# Patient Record
Sex: MALE | Race: BLACK OR AFRICAN AMERICAN | NOT HISPANIC OR LATINO | Employment: STUDENT | URBAN - METROPOLITAN AREA
[De-identification: names, ages, dates, MRNs, and addresses within clinical notes are randomized per-mention and may not be internally consistent; named-entity substitution may affect disease eponyms.]

---
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
## 2023-01-03 ENCOUNTER — ATHLETIC TRAINING SESSION (OUTPATIENT)
Dept: SPORTS MEDICINE | Facility: CLINIC | Age: 18
End: 2023-01-03
Payer: COMMERCIAL

## 2023-01-03 NOTE — PROGRESS NOTES
[facility]       Athletic Training Injury Evaluation Note     Name: Dieudonne Martin  Clinic Number: [id number]  Sport: Men's Basketball    Reason For Visit: Evaluation  Initial Injury Date: 1/3/2023  Affected Area: Left Shoulder    Visit Date: 1/3/2023      Subjective     HPI: Past history of Left shoulder instability    Injury happened during basketball practice. Patient was playing defense in the post. Arm/shoulder was out at 90 flexion and 90 IR on teammates back and teammate pushed/pulled arm down in which the patient felt immediate pain and weakness from shoulder.   Student-athlete reports: Shoulder pain and shoulder weakness      Objective     Observations:  Tenderness on anterior deltoid and on biceps tendon area.    ROM:  + RROM shoulder ER        Assessment     Initial Impression: Shoulder subluxation    Differential Diagnosis:  Left shoulder labral tear  Left shoulder subluxation      Plan     Contact team physician  Contact Devendra PT  Anti Inflammatories  Shoulder Rehab program, emphasizing PNFs and shoulder stabilization exercises.         Pop Davis MAT, LAT, ATC    [employer] [facility]

## 2023-01-08 ENCOUNTER — ATHLETIC TRAINING SESSION (OUTPATIENT)
Dept: SPORTS MEDICINE | Facility: CLINIC | Age: 18
End: 2023-01-08
Payer: COMMERCIAL

## 2023-01-09 ENCOUNTER — CLINICAL SUPPORT (OUTPATIENT)
Dept: REHABILITATION | Facility: HOSPITAL | Age: 18
End: 2023-01-09
Payer: COMMERCIAL

## 2023-01-09 DIAGNOSIS — M25.612 DECREASED RANGE OF MOTION OF LEFT SHOULDER: ICD-10-CM

## 2023-01-09 DIAGNOSIS — G25.89 SCAPULAR DYSKINESIS: ICD-10-CM

## 2023-01-09 DIAGNOSIS — M25.512 ACUTE PAIN OF LEFT SHOULDER: ICD-10-CM

## 2023-01-09 PROCEDURE — 97140 MANUAL THERAPY 1/> REGIONS: CPT

## 2023-01-09 PROCEDURE — 97110 THERAPEUTIC EXERCISES: CPT

## 2023-01-09 NOTE — PROGRESS NOTES
[facility]   Physical Therapy Treatment Note     Name: Dieudonne Martin  Clinic Number: [id number]    Therapy Diagnosis:   Encounter Diagnoses   Name Primary?    Decreased range of motion of left shoulder     Acute pain of left shoulder     Scapular dyskinesis      Physician: Augustus Sepulveda MD    Visit Date: 1/9/2023  Physician Orders: PT Eval and Treat   Medical Diagnosis from Referral: M25.312 (ICD-10-CM) - Shoulder instability, left  Evaluation Date: 11/3/2022  Authorization Period Expiration: 10/31/2023  Plan of Care Expiration: 3/3/2023  Visit # / Visits authorized: 1/ 20 (+eval)     Time In: 0100 pm  Time Out: 200 pm  Total Billable Time : 60 minutes     Precautions: Standard    SUBJECTIVE     Pt reports: he is continuing to have feelings of shoulder instability with even minor contact but has been diligent with his HEP recently.  He was compliant with home exercise program.  Response to previous treatment: decreased pain   Functional change: improved FE    Pain: 1/10  Location: left shoulder      OBJECTIVE     Objective Measures updated at progress report unless specified.        Passive Range of Motion:   Shoulder Right Left   Flexion 180 180   Abduction 180 180   ER at 0 50 45**   ER at 90 100 110   IR 60 60      Active Range of Motion:   Shoulder Right Left   Flexion 180 180   Abduction 180 180   ER at 0 45 45   ER at 90 95 90   IR 55 55   Reach behind head T2 T2   Reach behind back  L1 L1      Strength:  Shoulder Right Left   Flexion 5 4+   Abduction 5 5   ER 5 4   IR 4+ 4-   Serratus Anterior 3- 3-   Middle Trap 3+ 3   Low Trap 3- 3-      Joint Mobility: Slight apprehension end range L ER. Hypermobile scapular upward rotation, increased posterior glide on L      Palpation: Pain over biceps tendon on L        Treatment       Dieudonne received the treatments listed below:      THERAPEUTIC EXERCISES to develop strength, endurance, ROM, flexibility, posture, and core  stabilization for 50 minutes including:  - Manual resistance iso; ER/IR 3 min  - SL External Rotation; 1#; 4 x 10  - SA Wall slides; 2 x 10  - SA Bear hug; 4 x 10  - High row w/ ER; YTB; 4 x 10  - Wall robots; 2 x 10  - LT; Lv 1.0; 4 x 5  - MT Lv 2.0 4 x 5    MANUAL THERAPY TECHNIQUES including Joint mobilizations and Soft tissue Mobilization were applied to L shoulder for 10 minutes.  - Gr 1-2 glenohumeral oscilations for decreased guarding and pain control   - manual RTC facilitation MET; 4 min    NEUROMUSCULAR RE-EDUCATION ACTIVITIES to improve Balance, Coordination, Kinesthetic, Sense, Proprioception, and Posture for 00 minutes.  The following were included:     THERAPEUTIC ACTIVITIES to improve dynamic and functional performance for 00 minutes including.        Patient Education and Home Exercises     Home Exercises Provided and Patient Education Provided     Education provided:   - contiune HEP with emphasis on scap positioning with walkouts    Written Home Exercises Provided: Patient instructed to cont prior HEP. Exercises were reviewed and Dieudonne was able to demonstrate them prior to the end of the session.  Dieudonne demonstrated good  understanding of the education provided. See EMR under Patient Instructions for exercises provided during therapy sessions    ASSESSMENT   Dieudonne presents with similar and somewhat progressed symptoms compared to eugene. He reports 3-4 additional instability events since eval and no HEP compliance prior to this week. He has been playing basketball at [employer] in [employer] but continues to have symptoms with any contact. He demonstrated continues decreased RTC activaiton with anterior humeral glide apparent in resting posture, poor serratus and low trap activation with excess scapular wining, and decreased active range of motion. He will benefit consistent and progressive RTC and scapular retraining to improve stability in his glenohumeral joint and improve his ability to play  basketball at a D1 level.     Dieudonne Is progressing well towards his goals.   Pt prognosis is Good.     Pt will continue to benefit from skilled outpatient physical therapy to address the deficits listed in the problem list box on initial evaluation, provide pt/family education and to maximize pt's level of independence in the home and community environment.     Pt's spiritual, cultural and educational needs considered and pt agreeable to plan of care and goals.     Anticipated barriers to physical therapy: travel    Goals: Short Term Goals:  2 weeks  1.Report decreased shoulder pain < / =  0/10  to increase tolerance for return to basketball pain free  2. Increase PROM full motion pain free   3. Increased strength by 1/3 MMT grade in subscap to increase tolerance for ADL and work activities.  4. Pt to tolerate HEP to improve ROM and independence with ADL's     Long Term Goals: 6 weeks  1.Report decreased shoulder pain  < / =  0 /10  to increase tolerance for return to weightlifting  2.Increase AROM to full motion pain free  3.Increase strength to >/= 4/5 in serratus and mid/low trap to increase tolerance for ADL and work activities.  4. Pt goal: return to basketball pain free  5. Pt will have improved gcode of CJ (20-40% limited) on FOTO shoulder in order to demonstrate true functional improvement.     PLAN     Progress scapular strengthening and RTC activation    Peggy Velásquez, PT

## 2023-01-09 NOTE — PROGRESS NOTES
Dieudonne completed:    [x]  INJURY TREATMENT   []  MAINTENANCE  DATE OF SERVICE: 1/8/2023  INJURY/CONDITON: Left shoulder instability    Dieudonne received the selected modalities after being cleared for contradictions.  Dieudonne received education on potenital side effects of the selected modalities and agreed to treatment.      MODALITIES:    Cryotherapy / Thermotherapy Duration  (Mins) Add. Tx Parameters / Comment   []Cold Tub / Whirlpool (50-60 F)     []Contrast Bath (105-110 F & 50-65 F)     []Game Ready     []Hot Pack     []Hot Tub / Whirlpool ( F)     []Ice Massage     []Ice Pack     []Paraffin Wax (126-130 F)     []Vapocoolant Spray        Comment:       Electrotherapy Waveform   (AC/DC) Modulation (Cont./Interrupted/Surged) Intensity   (V) Pulse Width/Dur.  (uS) Pulse Rate/Freq.  (Hz, PPS or CPS) Duration  (Mins) Add. Tx Parameters / Comment   []Combo          []E-Stim - IFC          []E-Stim - Premod          []E-Stim - Northern Irish          []E-Stim - TENS          []E-Stim - Other          []Iontophoresis        Meds:     Comment:      Ultrasound Duty Cycle   (%) Freq.  (Mhz) Intensity   (w/cm2) Duration  (Mins) Add. Tx Parameters / Comment   []Combo        []Phonophoresis     Meds:   []Ultrasound         []Ultrasound and E-Stim          Comment:        Massage Duration  (Mins) Add. Tx Parameters / Comment   []Massage - IASTM     []Massage - Scar Tissue     []Massage - Self Administered     []Massage - Therapeutic     []Myofascial Release        Comment:      Other Modalities Duration  (Mins)  Add. Tx Parameters / Comment   []Active Release     []Cupping     []Dry Needling     []Intermittent Compression      []Laser     []Lightwave     []Traction      []Other:       Comment:      THERAPEUTIC EXERCISES:    Stretching Cardio Rehab Other   []Stretching - Active []Cardio - Bike []Rehab - Ankle/Foot []Agility [x]PNF   []Stretching - Dynamic []Cardio - Elliptical []Rehab - Knee []Balance [x]ROM - Active    []Stretching - Passive []Cardio - Jog/Run []Rehab - Hip []Blood Flow Restriction []ROM - Passive   []Stretching - PNF []Cardio - Treadmill []Rehab - Wrist/Hand []Foam Roller []RTP - Concussion Protocol   []Stretching - Static []Cardio - Upper Body Ergometer []Rehab - Elbow []Functional Exercises []RTP - Sport Specific    []Cardio - Walk [x]Rehab - Shoulder []Joint Mobilization [x]Strengthening Exercises     []Rehab - Neck/Spine []Manual Therapy []Other:     []Rehab - Back []Plyometric Exercises      []Rehab - Other       Comment:            Warm-Up Reps/Sets/Time Weight #                         Exercise Reps/Sets/Time Weight #   Prone IR 2x8    Prone Rows with ER 3x12 3lbs   Serratus Punch 2x15    Prone middle Trap 2x10 (3sec holds)    Lying banded IR 3x15                               Comment:      Miscellaneous Add. Tx Parameters / Comment   []Compression Wrap    []Support Wrap    []Taping - Preventative    []Taping - Injured Part    []Wound Care    []Other:      Comment:

## 2023-01-15 PROBLEM — M25.512 ACUTE PAIN OF LEFT SHOULDER: Status: ACTIVE | Noted: 2023-01-15

## 2023-01-15 PROBLEM — G25.89 SCAPULAR DYSKINESIS: Status: ACTIVE | Noted: 2023-01-15

## 2023-01-15 PROBLEM — M25.612 DECREASED RANGE OF MOTION OF LEFT SHOULDER: Status: ACTIVE | Noted: 2023-01-15

## 2023-01-19 ENCOUNTER — CLINICAL SUPPORT (OUTPATIENT)
Dept: REHABILITATION | Facility: HOSPITAL | Age: 18
End: 2023-01-19
Payer: COMMERCIAL

## 2023-01-19 DIAGNOSIS — G25.89 SCAPULAR DYSKINESIS: ICD-10-CM

## 2023-01-19 DIAGNOSIS — M25.512 ACUTE PAIN OF LEFT SHOULDER: ICD-10-CM

## 2023-01-19 DIAGNOSIS — M25.612 DECREASED RANGE OF MOTION OF LEFT SHOULDER: Primary | ICD-10-CM

## 2023-01-19 PROCEDURE — 97110 THERAPEUTIC EXERCISES: CPT

## 2023-01-19 PROCEDURE — 97140 MANUAL THERAPY 1/> REGIONS: CPT

## 2023-01-22 NOTE — PROGRESS NOTES
[facility]   Physical Therapy Treatment Note     Name: Dieudonne Washington  Clinic Number: [id number]    Therapy Diagnosis:   Encounter Diagnoses   Name Primary?    Decreased range of motion of left shoulder Yes    Acute pain of left shoulder     Scapular dyskinesis      Physician: Augustus Sepulveda MD    Visit Date: 1/19/2023  Physician Orders: PT Eval and Treat   Medical Diagnosis from Referral: M25.312 (ICD-10-CM) - Shoulder instability, left  Evaluation Date: 11/3/2022  Authorization Period Expiration: 10/31/2023  Plan of Care Expiration: 3/3/2023  Visit # / Visits authorized: 2/ 20 (+eval)     Time In: 0300 pm  Time Out: 0400 pm  Total Billable Time : 60 minutes     Precautions: Standard    SUBJECTIVE     Pt reports: he has been consistent with his HEP daily and reports no new instances of instability  He was compliant with home exercise program.  Response to previous treatment: decreased pain   Functional change: improved FE    Pain: 1/10  Location: left shoulder      OBJECTIVE     Objective Measures updated at progress report unless specified.     Treatment       Dieudonne received the treatments listed below:      THERAPEUTIC EXERCISES to develop strength, endurance, ROM, flexibility, posture, and core stabilization for 50 minutes including:  - prone Internal Rotation; 2#; 3 x 15  - prone T;1#; 3 x 10  - SA Wall slides; 2 x 10  - SA Bear hug; 4 x 10  - Walkouts at 90 abd; ER/IR OTB; GTB; 3 x 10   - High row w/ ER; YTB; 4 x 10- NP  - Wall robots; 2 x 10  - LT; Lv 1.0; 4 x 5  - SL External Rotation; 2#; 3 x burn    MANUAL THERAPY TECHNIQUES including Joint mobilizations and Soft tissue Mobilization were applied to L shoulder for 10 minutes.  - Gr 1-2 glenohumeral oscilations for decreased guarding and pain control   - manual RTC facilitation MET; 4 min    NEUROMUSCULAR RE-EDUCATION ACTIVITIES to improve Balance, Coordination, Kinesthetic, Sense, Proprioception, and Posture for 00  minutes.  The following were included:     THERAPEUTIC ACTIVITIES to improve dynamic and functional performance for 00 minutes including.        Patient Education and Home Exercises     Home Exercises Provided and Patient Education Provided     Education provided:   - contiune HEP with emphasis on scap positioning with walkouts    Written Home Exercises Provided: Patient instructed to cont prior HEP. Exercises were reviewed and Dieudonne was able to demonstrate them prior to the end of the session.  Dieudonne demonstrated good  understanding of the education provided. See EMR under Patient Instructions for exercises provided during therapy sessions    ASSESSMENT   Dieudonne demonstrated improved motor control and decreased irritability for all exercises today. Most notable improvement in prone Internal Rotation with good form and increased resistance. HE was greatly fatiged by walkouts, but was able to perform this with good form and only occasional verbal cues. Will continued to progress stability exercises as appropriate/.    Dieudonne Is progressing well towards his goals.   Pt prognosis is Good.     Pt will continue to benefit from skilled outpatient physical therapy to address the deficits listed in the problem list box on initial evaluation, provide pt/family education and to maximize pt's level of independence in the home and community environment.     Pt's spiritual, cultural and educational needs considered and pt agreeable to plan of care and goals.     Anticipated barriers to physical therapy: travel    Goals: Short Term Goals:  2 weeks  1.Report decreased shoulder pain < / =  0/10  to increase tolerance for return to basketball pain free  2. Increase PROM full motion pain free   3. Increased strength by 1/3 MMT grade in subscap to increase tolerance for ADL and work activities.  4. Pt to tolerate HEP to improve ROM and independence with ADL's     Long Term Goals: 6 weeks  1.Report decreased shoulder pain  < / =   0 /10  to increase tolerance for return to weightlifting  2.Increase AROM to full motion pain free  3.Increase strength to >/= 4/5 in serratus and mid/low trap to increase tolerance for ADL and work activities.  4. Pt goal: return to basketball pain free  5. Pt will have improved gcode of CJ (20-40% limited) on FOTO shoulder in order to demonstrate true functional improvement.     PLAN     Progress scapular strengthening and RTC activation    Peggy Velásquez, PT

## 2023-01-31 ENCOUNTER — ATHLETIC TRAINING SESSION (OUTPATIENT)
Dept: SPORTS MEDICINE | Facility: CLINIC | Age: 18
End: 2023-01-31
Payer: COMMERCIAL

## 2023-02-02 ENCOUNTER — CLINICAL SUPPORT (OUTPATIENT)
Dept: REHABILITATION | Facility: HOSPITAL | Age: 18
End: 2023-02-02
Payer: COMMERCIAL

## 2023-02-02 ENCOUNTER — TELEPHONE (OUTPATIENT)
Dept: SPORTS MEDICINE | Facility: CLINIC | Age: 18
End: 2023-02-02
Payer: COMMERCIAL

## 2023-02-02 DIAGNOSIS — G25.89 SCAPULAR DYSKINESIS: ICD-10-CM

## 2023-02-02 DIAGNOSIS — M25.612 DECREASED RANGE OF MOTION OF LEFT SHOULDER: Primary | ICD-10-CM

## 2023-02-02 DIAGNOSIS — M25.512 ACUTE PAIN OF LEFT SHOULDER: ICD-10-CM

## 2023-02-02 PROCEDURE — 97112 NEUROMUSCULAR REEDUCATION: CPT

## 2023-02-02 PROCEDURE — 97110 THERAPEUTIC EXERCISES: CPT

## 2023-02-02 PROCEDURE — 97140 MANUAL THERAPY 1/> REGIONS: CPT

## 2023-02-02 NOTE — TELEPHONE ENCOUNTER
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 He gestures to the plantar hindfoot and midfoot when asked where he hurts and states his pain is increased first thing in the morning with the first few steps he takes getting out of bed.  He has a history of flat feet and was fitted with custom orthotics in the past but he does not wear these currently due to discomfort.  He denies any new shoe wear.  Denies any specific injury or trauma that exacerbated symptoms.  He denies numbness and tingling.  He has not been taking anything for this.  He has been working on some soft tissue work with his .    Physical Exam:  ANKLE: left   The affected ankle is compared to the contralateral ankle.    Observation:    There is no edema, erythema, or ecchymosis.   Shoes reveal a normal wear pattern.  No structural deformities including pes planus/cavus, hallux valgus, or toe deformities.   Negative too-many toes sign.  Normal callus pattern on the feet bilaterally.    Achilles tendon and calcaneal insertion reveals no deformities  No leg or intrinsic foot muscle atrophy.  Squatting reveals symmetric pronation of the bilateral feet.   Able to rise on toes with symmetric supination.    Normal gait without evidence of antalgia.    ROM: (expected degree)  Active dorsiflexion to 20° bilaterally.    Active plantarflexion to 50° bilaterally.    Active ankle inversion to 35° bilaterally.    Active ankle eversion to 15° bilaterally.    Full active flexion/extension of the toes bilaterally.   Heel cords without tightness bilaterally.    Tenderness To Palpation:  No tenderness at the ATFL, CFL, PTFL, or deltoid ligaments  No tenderness over the distal anterior syndesmosis, distal  tibia/fibula, fibular head/shaft  No tenderness at medial or lateral malleoli   No tenderness at navicular, cuboid, cuneiforms, talus  No tenderness to palpation of the calcaneous  No tenderness along the metatarsals or phalanges  No tenderness at the Achilles tendon calcaneal insertion  No tenderness at the tibialis posterior, flexor digitorum longus, flexor hallucis longus   No tenderness at the peroneal tendons  + tenderness in the proximal aspect of the plantar fascia    Strength Testing:  Dorsiflexion - 5/5  Platarflexion - 5/5  Resisted Inversion - 5/5  Resisted Eversion - 5/5  Great Toe Extension - 5/5  Great Toe Flexion - 5/5    Special Tests:  Anterior talar drawer - negative and without dimpling  Talar tilt - negative    Heel tap test - negative  Distal tib/fib squeeze test - negative    Metatarsal squeeze test - negative  Midfoot stress test - negative  Calcaneal squeeze test - negative    No subluxation of the peroneal tendons with resisted eversion.    Vascular/Sensory Exam:  DP and PT pulses intact BL  No skin changes, no abnormal hair distribution  Sensation intact to light touch throughout the saphenous, sural, superficial peroneal, deep peroneal, and tibial nerve distributions  Capillary refill intact <2 seconds in all toes bilaterally.      Assessment:  Left foot pain/plantar fasciitis       Plan:     Symptoms, exam, are most consistent with plantar fasciitis.  We discussed the importance of decreasing inflammation and strengthening and stabilizing to help promote and maintain symptom improvement/resolution.  This is commonly accomplished with a short course of an anti-inflammatory and icing in addition to osteopathic manipulation, a home exercise program or physical therapy.    Hudson insoles recommended.  Education provided on how to properly start and progress inserts with his ATC.      Medications - Mobic 15 mg to be taken daily for 2 weeks followed by as needed.  He was prescribed this for his  shoulder but did not take the medicine.    Exercises - Plantar fascia rehabilitation program with his .  Strong emphasis on consistency to stretches, frozen water bottle massage, foot intrinsic strengthening and stabilizing, addressing foot mechanics    Referrals - None needed at this time.    Imaging - will consider imaging if symptoms do not improve with the above plan.    Follow up - As needed.  Continue with all basketball activity as tolerated at this time.            This note was completed in the presence of the physician noted above    This note is dictated using the M*Modal Fluency Direct word recognition program. There are word recognition mistakes that are occasionally missed on review.

## 2023-02-02 NOTE — PROGRESS NOTES
[facility]   Physical Therapy Treatment Note     Name: Dieudonne Washington  Clinic Number: [id number]    Therapy Diagnosis:   Encounter Diagnoses   Name Primary?    Decreased range of motion of left shoulder Yes    Acute pain of left shoulder     Scapular dyskinesis      Physician: Augustus Sepulveda MD    Visit Date: 2/2/2023  Physician Orders: PT Eval and Treat   Medical Diagnosis from Referral: M25.312 (ICD-10-CM) - Shoulder instability, left  Evaluation Date: 11/3/2022  Authorization Period Expiration: 10/31/2023  Plan of Care Expiration: 3/3/2023  Visit # / Visits authorized: 3/ 20 (+eval)     Time In: 0100 pm  Time Out: 0200 pm  Total Billable Time : 60 minutes     Precautions: Standard    SUBJECTIVE     Pt reports: he has been practicing and playing without issue in his shoulder, continuing to wear his Mayra brace  He was compliant with home exercise program.  Response to previous treatment: decreased pain   Functional change: improved FE    Pain: 1/10  Location: left shoulder      OBJECTIVE     Objective Measures updated at progress report unless specified.     Treatment       Dieudonne received the treatments listed below:      THERAPEUTIC EXERCISES to develop strength, endurance, ROM, flexibility, posture, and core stabilization for 15 minutes including:  - prone Internal Rotation; 2#; 3 x 15- NP  - prone T;1#; 3 x 10  - Walkouts at 90 abd; ER/IRGTB; 3 x 10   - High row w/ ER; YTB; 4 x 10- NP  - SL External Rotation; 2#; 3 x burn    MANUAL THERAPY TECHNIQUES including Joint mobilizations and Soft tissue Mobilization were applied to L shoulder for 10 minutes.  - Gr 1-2 glenohumeral oscilations for decreased guarding and pain control   - manual RTC facilitation MET; 4 min  - t/s HVALT    NEUROMUSCULAR RE-EDUCATION ACTIVITIES to improve Balance, Coordination, Kinesthetic, Sense, Proprioception, and Posture for 35 minutes.  The following were included:   - SA Wall slides; 2 x 10- NP  - SA  Bear hug; 4 x 10  - Wall robots; 2 x 10  - LT; Lv 1.0; 4 x 10  - ABCs, flex/abd; 3 x  - Hand- heel rocks w/ LT reach; 20x     THERAPEUTIC ACTIVITIES to improve dynamic and functional performance for 00 minutes including.        Patient Education and Home Exercises     Home Exercises Provided and Patient Education Provided     Education provided:   - contiune HEP with emphasis on scap positioning with walkouts    Written Home Exercises Provided: Patient instructed to cont prior HEP. Exercises were reviewed and Dieudonne was able to demonstrate them prior to the end of the session.  Dieudonne demonstrated good  understanding of the education provided. See EMR under Patient Instructions for exercises provided during therapy sessions    ASSESSMENT   Dieudonne continued to improve in motor control with better recruitment of low trap when noted with fewer cues. He tolerated initiation of light CKC stability and will benefit continued progressive RTC and periscapular strengthening and retraining.     Dieudonne Is progressing well towards his goals.   Pt prognosis is Good.     Pt will continue to benefit from skilled outpatient physical therapy to address the deficits listed in the problem list box on initial evaluation, provide pt/family education and to maximize pt's level of independence in the home and community environment.     Pt's spiritual, cultural and educational needs considered and pt agreeable to plan of care and goals.     Anticipated barriers to physical therapy: travel    Goals: Short Term Goals:  2 weeks  1.Report decreased shoulder pain < / =  0/10  to increase tolerance for return to basketball pain free  2. Increase PROM full motion pain free   3. Increased strength by 1/3 MMT grade in subscap to increase tolerance for ADL and work activities.  4. Pt to tolerate HEP to improve ROM and independence with ADL's     Long Term Goals: 6 weeks  1.Report decreased shoulder pain  < / =  0 /10  to increase tolerance for  return to weightlifting  2.Increase AROM to full motion pain free  3.Increase strength to >/= 4/5 in serratus and mid/low trap to increase tolerance for ADL and work activities.  4. Pt goal: return to basketball pain free  5. Pt will have improved gcode of CJ (20-40% limited) on FOTO shoulder in order to demonstrate true functional improvement.     PLAN     Progress scapular strengthening and RTC activation    Peggy Velásquez, PT

## 2023-03-08 ENCOUNTER — TELEPHONE (OUTPATIENT)
Dept: SPORTS MEDICINE | Facility: CLINIC | Age: 18
End: 2023-03-08
Payer: COMMERCIAL

## 2023-03-08 DIAGNOSIS — M25.312 SHOULDER INSTABILITY, LEFT: ICD-10-CM

## 2023-03-08 DIAGNOSIS — M25.519 PAIN IN UNSPECIFIED SHOULDER: Primary | ICD-10-CM

## 2023-03-10 ENCOUNTER — HOSPITAL ENCOUNTER (OUTPATIENT)
Dept: RADIOLOGY | Facility: HOSPITAL | Age: 18
Discharge: HOME | End: 2023-03-10
Attending: ORTHOPAEDIC SURGERY
Payer: COMMERCIAL

## 2023-03-10 DIAGNOSIS — M25.312 SHOULDER INSTABILITY, LEFT: ICD-10-CM

## 2023-03-10 PROCEDURE — 23350 XR ARTHROGRAM SHOULDER LEFT, INJECTION ONLY (XPD): ICD-10-PCS | Mod: LT,,, | Performed by: INTERNAL MEDICINE

## 2023-03-10 PROCEDURE — 73222 MRI JOINT UPR EXTREM W/DYE: CPT | Mod: TC,LT

## 2023-03-10 PROCEDURE — 73222 MRI ARTHROGRAM SHOULDER WITH CONTRAST LEFT: ICD-10-PCS | Mod: 26,LT,, | Performed by: RADIOLOGY

## 2023-03-10 PROCEDURE — A9585 GADOBUTROL INJECTION: HCPCS | Performed by: ORTHOPAEDIC SURGERY

## 2023-03-10 PROCEDURE — 77002 NEEDLE LOCALIZATION BY XRAY: CPT | Mod: 26,LT,, | Performed by: INTERNAL MEDICINE

## 2023-03-10 PROCEDURE — 77002 XR ARTHROGRAM SHOULDER LEFT, INJECTION ONLY (XPD): ICD-10-PCS | Mod: 26,LT,, | Performed by: INTERNAL MEDICINE

## 2023-03-10 PROCEDURE — 25000003 PHARM REV CODE 250: Performed by: ORTHOPAEDIC SURGERY

## 2023-03-10 PROCEDURE — 73222 MRI JOINT UPR EXTREM W/DYE: CPT | Mod: 26,LT,, | Performed by: RADIOLOGY

## 2023-03-10 PROCEDURE — 25500020 PHARM REV CODE 255: Performed by: ORTHOPAEDIC SURGERY

## 2023-03-10 PROCEDURE — 23350 INJECTION FOR SHOULDER X-RAY: CPT | Mod: LT,,, | Performed by: INTERNAL MEDICINE

## 2023-03-10 RX ORDER — LIDOCAINE HYDROCHLORIDE 10 MG/ML
5 INJECTION INFILTRATION; PERINEURAL ONCE
Status: COMPLETED | OUTPATIENT
Start: 2023-03-10 | End: 2023-03-10

## 2023-03-10 RX ORDER — GADOBUTROL 604.72 MG/ML
7 INJECTION INTRAVENOUS
Status: COMPLETED | OUTPATIENT
Start: 2023-03-10 | End: 2023-03-10

## 2023-03-10 RX ADMIN — IOHEXOL 9 ML: 300 INJECTION, SOLUTION INTRAVENOUS at 12:03

## 2023-03-10 RX ADMIN — GADOBUTROL 7 ML: 604.72 INJECTION INTRAVENOUS at 12:03

## 2023-03-10 RX ADMIN — LIDOCAINE HYDROCHLORIDE 5 ML: 10 INJECTION, SOLUTION INFILTRATION; PERINEURAL at 12:03

## 2023-03-14 DIAGNOSIS — M75.22 BICEPS TENDONITIS ON LEFT: ICD-10-CM

## 2023-03-14 DIAGNOSIS — S43.432A SLAP LESION OF LEFT SHOULDER: ICD-10-CM

## 2023-03-14 DIAGNOSIS — M25.312 INSTABILITY OF LEFT SHOULDER JOINT: Primary | ICD-10-CM

## 2023-03-15 ENCOUNTER — PATIENT MESSAGE (OUTPATIENT)
Dept: PREADMISSION TESTING | Facility: HOSPITAL | Age: 18
End: 2023-03-15
Payer: COMMERCIAL

## 2023-03-15 ENCOUNTER — OFFICE VISIT (OUTPATIENT)
Dept: SPORTS MEDICINE | Facility: CLINIC | Age: 18
End: 2023-03-15
Payer: COMMERCIAL

## 2023-03-15 VITALS
WEIGHT: 234 LBS | SYSTOLIC BLOOD PRESSURE: 126 MMHG | DIASTOLIC BLOOD PRESSURE: 74 MMHG | BODY MASS INDEX: 27.07 KG/M2 | HEART RATE: 66 BPM | HEIGHT: 78 IN

## 2023-03-15 DIAGNOSIS — M75.22 BICEPS TENDONITIS ON LEFT: ICD-10-CM

## 2023-03-15 DIAGNOSIS — M25.312 INSTABILITY OF LEFT SHOULDER JOINT: Primary | ICD-10-CM

## 2023-03-15 PROCEDURE — 99499 UNLISTED E&M SERVICE: CPT | Mod: S$GLB,,, | Performed by: PHYSICIAN ASSISTANT

## 2023-03-15 PROCEDURE — 99499 NO LOS: ICD-10-PCS | Mod: S$GLB,,, | Performed by: PHYSICIAN ASSISTANT

## 2023-03-15 PROCEDURE — 99999 PR PBB SHADOW E&M-EST. PATIENT-LVL III: CPT | Mod: PBBFAC,,, | Performed by: PHYSICIAN ASSISTANT

## 2023-03-15 PROCEDURE — 99999 PR PBB SHADOW E&M-EST. PATIENT-LVL III: ICD-10-PCS | Mod: PBBFAC,,, | Performed by: PHYSICIAN ASSISTANT

## 2023-03-15 NOTE — ANESTHESIA PAT ROS NOTE
03/15/2023  Dieudonne Martin is a 17 y.o., male.      Pre-op Assessment    I have reviewed the Patient Summary Reports.       I have reviewed the Medications.     Review of Systems  Anesthesia Hx:  No previous Anesthesia   Neg history of prior surgery.             Social:  Non-Smoker, Social Alcohol Use       Hematology/Oncology:  Hematology Normal   Oncology Normal                                   EENT/Dental:  EENT/Dental Normal           Cardiovascular:  Cardiovascular Normal Exercise tolerance: good                Denies SINGLETON.                            Pulmonary:  Pulmonary Normal      Denies Shortness of breath.                  Renal/:  Renal/ Normal                 Hepatic/GI:  Hepatic/GI Normal                 Musculoskeletal:     Instability of left shoulder joint,  Biceps tendonitis on left,  SLAP lesion of left shoulder              Neurological:  Neurology Normal      Denies Headaches. Denies Seizures.                                Endocrine:  Endocrine Normal            Psych:  Psychiatric Normal                   No past medical history on file.  No past surgical history on file.      Anesthesia Assessment: Preoperative EQUATION    Planned Procedure: Procedure(s) (LRB):  ARTHROSCOPY, SHOULDER, WITH BANKART REPAIR (Left)  CAPSULORRHAPHY (Left)  REPAIR, SLAP LESION, SHOULDER (Left)  FIXATION, TENDON (Left)  DEBRIDEMENT, SHOULDER, ARTHROSCOPIC (Left)  Requested Anesthesia Type:General  Surgeon: Tiffani Snow MD  Service: Orthopedics  Known or anticipated Date of Surgery:3/21/2023    Surgeon notes: reviewed    Electronic QUestionnaire Assessment completed via nurse interview with patient.        Triage considerations:     The patient has no apparent active cardiac condition (No unstable coronary Syndrome such as severe unstable angina or recent [<1 month] myocardial infarction, decompensated  CHF, severe valvular   disease or significant arrhythmia)    Previous anesthesia records:None    Last PCP note: > 1 year ago , within [facility] is a healthy, active .               Instructions given. (See in Nurse's note)    Ht: 6'8  Wt: 230 lb  BMI: 25.27  Vaccinated

## 2023-03-19 RX ORDER — OXYCODONE AND ACETAMINOPHEN 10; 325 MG/1; MG/1
TABLET ORAL
Qty: 21 TABLET | Refills: 0 | Status: SHIPPED | OUTPATIENT
Start: 2023-03-19

## 2023-03-19 RX ORDER — TRAMADOL HYDROCHLORIDE 50 MG/1
50-100 TABLET ORAL EVERY 6 HOURS PRN
Qty: 21 TABLET | Refills: 0 | Status: SHIPPED | OUTPATIENT
Start: 2023-03-19

## 2023-03-19 RX ORDER — OXYCODONE HCL 10 MG/1
10 TABLET, FILM COATED, EXTENDED RELEASE ORAL
Status: CANCELLED | OUTPATIENT
Start: 2023-03-20 | End: 2023-03-20

## 2023-03-19 RX ORDER — CLINDAMYCIN PHOSPHATE 900 MG/50ML
900 INJECTION, SOLUTION INTRAVENOUS
Status: CANCELLED | OUTPATIENT
Start: 2023-03-19

## 2023-03-19 RX ORDER — SODIUM CHLORIDE 9 MG/ML
INJECTION, SOLUTION INTRAVENOUS CONTINUOUS
Status: CANCELLED | OUTPATIENT
Start: 2023-03-20

## 2023-03-19 RX ORDER — PREGABALIN 75 MG/1
75 CAPSULE ORAL
Status: CANCELLED | OUTPATIENT
Start: 2023-03-20 | End: 2023-03-20

## 2023-03-19 RX ORDER — PROMETHAZINE HYDROCHLORIDE 25 MG/1
25 TABLET ORAL EVERY 6 HOURS PRN
Qty: 8 TABLET | Refills: 0 | Status: SHIPPED | OUTPATIENT
Start: 2023-03-19

## 2023-03-19 RX ORDER — ASPIRIN 81 MG/1
81 TABLET ORAL DAILY
Qty: 28 TABLET | Refills: 0 | Status: SHIPPED | OUTPATIENT
Start: 2023-03-19 | End: 2024-03-18

## 2023-03-20 NOTE — H&P
Dieudonne Martin  is here for a completion of his perioperative paperwork. he  Is scheduled to undergo        left  a. Shoulder arthroscopic anterior labral repair  b. Shoulder arthroscopic anterior capsulorrhaphy  c. Shoulder arthroscopic posterior labral repair  d. Shoulder arthroscopic posterior capsulorrhaphy  e. Shoulder arthroscopic partial synovectomy/debridement  f.  Shoulder arthroscopic possible biceps tenodesis (vs. open subpect tenodesis)  g. Shoulder arthroscopic possible SLAP repair on 3/21/23.      He is a healthy individual and does not need clearance for this procedure.     PAST MEDICAL HISTORY: History reviewed. No pertinent past medical history.  PAST SURGICAL HISTORY: History reviewed. No pertinent surgical history.  FAMILY HISTORY:   Family History   Problem Relation Age of Onset    No Known Problems Mother     No Known Problems Father      SOCIAL HISTORY:   Social History     Socioeconomic History    Marital status: Single   Tobacco Use    Smoking status: Never    Smokeless tobacco: Never   Substance and Sexual Activity    Alcohol use: Yes    Sexual activity: Yes       MEDICATIONS:   Current Outpatient Medications:     meloxicam (MOBIC) 15 MG tablet, Take 1 tablet (15 mg total) by mouth once daily., Disp: 30 tablet, Rfl: 0    aspirin (ECOTRIN) 81 MG EC tablet, Take 1 tablet (81 mg total) by mouth once daily. For 4 weeks starting the day after surgery., Disp: 28 tablet, Rfl: 0    oxyCODONE-acetaminophen (PERCOCET)  mg per tablet, Take 1 tablet by mouth every 4-6 hours as needed for pain. Take stool softener with this medication., Disp: 21 tablet, Rfl: 0    promethazine (PHENERGAN) 25 MG tablet, Take 1 tablet (25 mg total) by mouth every 6 (six) hours as needed for Nausea., Disp: 8 tablet, Rfl: 0    traMADoL (ULTRAM) 50 mg tablet, Take 1-2 tablets ( mg total) by mouth every 6 (six) hours as needed for Pain., Disp: 21 tablet, Rfl: 0  ALLERGIES: Review of patient's allergies  "indicates:  No Known Allergies    VITAL SIGNS: /74   Pulse 66   Ht 6' 8" (2.032 m)   Wt 106.1 kg (234 lb)   BMI 25.71 kg/m²      Risks, indications and benefits of the surgical procedure were discussed with the patient. All questions with regard to surgery, rehab, expected return to functional activities, activities of daily living and recreational endeavors were answered to his satisfaction.    It was explained to the patient that there may be an increase in surgical risks if the patient has certain co-morbidities such as but not limited to: Obesity, Cardiovascular issues (CHF, CAD, Arrhythmias), chronic pulmonary issues, previous or current neurovascular/neurological issues, previous strokes, diabetes mellitus, previous wound healing issues, previous wound or skin infections, PVD, clotting disorders, if the patient uses chronic steroids, if the patient takes or has immune compromising medications or diseases, or has previously or currently used tobacco products.     The patient verbalized that he/she does not have any additional clotting, bleeding, or blood disorders, other than what is list in her chart on today's review.     Then a brief history and physical exam were performed.    Review of Systems   Constitution: Negative. Negative for chills, fever and night sweats.   HENT: Negative for congestion and headaches.    Eyes: Negative for blurred vision, left vision loss and right vision loss.   Cardiovascular: Negative for chest pain and syncope.   Respiratory: Negative for cough and shortness of breath.    Endocrine: Negative for polydipsia, polyphagia and polyuria.   Hematologic/Lymphatic: Negative for bleeding problem. Does not bruise/bleed easily.   Skin: Negative for dry skin, itching and rash.   Musculoskeletal: Negative for falls and muscle weakness.   Gastrointestinal: Negative for abdominal pain and bowel incontinence.   Genitourinary: Negative for bladder incontinence and nocturia. "   Neurological: Negative for disturbances in coordination, loss of balance and seizures.   Psychiatric/Behavioral: Negative for depression. The patient does not have insomnia.    Allergic/Immunologic: Negative for hives and persistent infections.     PHYSICAL EXAM:  GEN: A&Ox3, WD WN NAD  HEENT: WNL  CHEST: CTAB, no W/R/R  HEART: RRR, no M/R/G  ABD: Soft, NT ND, BS x4 QUADS  MS; See Epic  NEURO: CN II-XII intact       The surgical consent was then reviewed with the patient, who agreed with all the contents of the consent form and it was signed. he was then given the surgery packet to bring with him to surgery center for the anesthesia portion of his perioperative paperwork (if needed)   For all physicians except for Dr. Forman, we will email and possibly fax the consent forms and booking sheets to [facility].    The patient was given the opportunity to ask questions about the surgical plan and consent form, and once no other questions were asked, I proceeded with the pre-op appointment.    PHYSICAL THERAPY:  He was also instructed regarding physical therapy and will begin on  3 weeks post-op. He was given a copy of the original prescription to schedule. Another copy of this prescription was also faxed to [facility].    POST OP CARE:instructions were reviewed including care of the wound and dressing after surgery and when he can shower. Patient was told not sleep or lay on there surgical extremity following surgery as this could cause repair damage, tissue damage, or nerve injury.    An extensive amount of time was spent on discussion of the following information based on what type of surgery the patient was having. Patient expressed understanding of the material below:    Shoulder surgery or upper extremity surgery requiring post-op sling:  It was explained to the patient that they should remove their arm from the sling approximately 6 times per day to do full elbow ROM (flexion and extension) and  full supination and pronation of the elbow for approximately 5 minutes at a time to help prevent elbow stiffness, nerve pain or problems, or nerve injury. They were told to contact us if they begin having numbness and tingling of there surgical extremity that persists longer then 1 day without relief.     Extremity surgery requiring a splint:   It was explained to patient on how to properly elevated position there extremity to prevent pressure ulcers from occurring. I made sure that the patient understood that that surgical site may be numb following surgery and prevent them from feeling pressure pain that they would normal feel if a pressure injury was occurring. Pressure ulcers and there causes were discussed with the patient today.     Post-operative splint:  It was explain to the patient that they can contact us at anytime if they feel that there is a problem with their splint or under their splint that needs evaluation. If there is concern, questions, or discomfort with the splint then they can present to either our clinic or the [facility] ED for removal, evaluation, and replacement of the splint.    CRUTCHES OR WALKER: It was explained to the patient that if they are having a lower extremity surgery that they will require either a walker or crutches to ambulate safely with after surgery. It was explained that a cane or other assistive devices are not sufficient to safely ambulate with after surgery. I explained to the patient that I will place an order for them to receive either crutches or a walker after surgery to go home with. It was explained that if they have crutches or a walker at home already, that they are REQUIRED to bring them to the hospital on the day of surgery. It was explained that if they do not have them at the hospital on the day of surgery that they WILL be provided a new pair or crutches or a walker to go home with to ensure ambulation will be safe if the patient needs to stop  somewhere on the way home.      PAIN MANAGEMENT: Dieudonne Martin was also given a pain management regime, which includes the option of getting a TENS unit given to him by [facility] (if patient chooses) along with the education required for its use. He was also instructed regarding the Polar ice unit or gel ice packs (chosen by patient) that will be in place after surgery and his postoperative pain medications.     Patient understands that Polar Ice machine has to be bought today if they want it. It cannot be bought on day of surgery at surgery center.     PAIN MEDICATION:  Percocet 10/325mg 1 po q 4-6 hours prn pain  Ultram 50 mg Take 1-2 p.o. q.6 hours p.r.n. breakthrough pain,   Phenergan 25 mg one p.o. q.6 hours p.r.n. nausea and vomiting.    DVT prophylaxis was discussed with the patient today including risk factors for developing DVTs and history of DVTs. The patient was asked if any specific recommendations were given from the doctor/s that did pre-operative surgical clearance. The patient was then given an education sheet about DVTs and PE with warning signs and symptoms of both and steps to take if they suspect either of these.    This along with the Modified Caprini risk assessment model for VTE in general surgical patients was used to determine the patient's DVT risk.     From: Frances MK, Nelson DA, Jacinta SM, et al. Prevention of VTE in nonorthopedic surgical patients: antithrombotic therapy and prevention of thrombosis, 9th ed: American College of [employer]icians evidence-based clinical practical guidelines. Chest 2012; 141:e227S. Copyright © 2012. Reproduced with permission from the American College of Chest Physicians.    The below listed DVT prophylaxis regimen along with bilateral ELMO compression stockings will be used post-op. Length of treatment has been determined to be 10-42 days post-op by the above noted Caprini assessment model.     The patient was instructed to buy and take:  Aspirin 81mg  QD x 4 weeks for DVT prophylaxis starting on the morning after surgery.  Patient will also use bilateral TEDs on lower extremities, SCDs during surgery, and early ambulation post-op. If the patient was previously taking 81mg baby aspirin, they were told to not take it starting 5 days prior to surgery and to restart the 81mg aspirin after surgery.       Patient was also told to buy over the counter Prilosec medication if needed and take it once daily for GI protection as long as they are taking NSAIDs or Aspirin.   Patient denies history of seizures.      The patient was told that narcotic pain medications may make them drowsy and instructions were given to not sign legal documents, drive or operate heavy machinery, cars, or equipment while under the influence of narcotic medications. The patient was told and understands that narcotic pain medications should only be used as needed to control pain and that other options of pain control include TENs unit and ice packs/unit.     As there were no other questions to be asked, he was given my business card along with Tiffani Snow MD business card if he has any questions or concerns prior to surgery or in the postop period.

## 2023-03-21 ENCOUNTER — ANESTHESIA EVENT (OUTPATIENT)
Dept: SURGERY | Facility: HOSPITAL | Age: 18
End: 2023-03-21
Payer: COMMERCIAL

## 2023-03-21 ENCOUNTER — ANESTHESIA (OUTPATIENT)
Dept: SURGERY | Facility: HOSPITAL | Age: 18
End: 2023-03-21
Payer: COMMERCIAL

## 2023-03-21 ENCOUNTER — HOSPITAL ENCOUNTER (OUTPATIENT)
Facility: HOSPITAL | Age: 18
Discharge: HOME | End: 2023-03-21
Attending: ORTHOPAEDIC SURGERY | Admitting: ORTHOPAEDIC SURGERY
Payer: COMMERCIAL

## 2023-03-21 VITALS
DIASTOLIC BLOOD PRESSURE: 70 MMHG | RESPIRATION RATE: 20 BRPM | HEIGHT: 78 IN | BODY MASS INDEX: 27.07 KG/M2 | TEMPERATURE: 98 F | HEART RATE: 69 BPM | WEIGHT: 234 LBS | OXYGEN SATURATION: 100 % | SYSTOLIC BLOOD PRESSURE: 146 MMHG

## 2023-03-21 DIAGNOSIS — M75.22 BICEPS TENDONITIS ON LEFT: ICD-10-CM

## 2023-03-21 DIAGNOSIS — M25.312 INSTABILITY OF LEFT SHOULDER JOINT: ICD-10-CM

## 2023-03-21 PROCEDURE — 63600175 PHARM REV CODE 636 W HCPCS: Performed by: ANESTHESIOLOGY

## 2023-03-21 PROCEDURE — 25000003 PHARM REV CODE 250: Performed by: ORTHOPAEDIC SURGERY

## 2023-03-21 PROCEDURE — D9220A PRA ANESTHESIA: Mod: CRNA,,, | Performed by: NURSE ANESTHETIST, CERTIFIED REGISTERED

## 2023-03-21 PROCEDURE — 63600175 PHARM REV CODE 636 W HCPCS: Performed by: ORTHOPAEDIC SURGERY

## 2023-03-21 PROCEDURE — 71000015 HC POSTOP RECOV 1ST HR: Performed by: ORTHOPAEDIC SURGERY

## 2023-03-21 PROCEDURE — 63600175 PHARM REV CODE 636 W HCPCS: Performed by: NURSE ANESTHETIST, CERTIFIED REGISTERED

## 2023-03-21 PROCEDURE — 29827 PR SHLDR ARTHROSCOP,SURG,W/ROTAT CUFF REPR: ICD-10-PCS | Mod: 51,LT,, | Performed by: ORTHOPAEDIC SURGERY

## 2023-03-21 PROCEDURE — 25000003 PHARM REV CODE 250: Performed by: NURSE ANESTHETIST, CERTIFIED REGISTERED

## 2023-03-21 PROCEDURE — D9220A PRA ANESTHESIA: Mod: ANES,,, | Performed by: ANESTHESIOLOGY

## 2023-03-21 PROCEDURE — D9220A PRA ANESTHESIA: ICD-10-PCS | Mod: ANES,,, | Performed by: ANESTHESIOLOGY

## 2023-03-21 PROCEDURE — 29807 SHO ARTHRS SRG RPR SLAP LES: CPT | Mod: 59,LT,, | Performed by: ORTHOPAEDIC SURGERY

## 2023-03-21 PROCEDURE — 29806 PR SHLDR ARTHROSCOP,SURG,CAPSULORRHAPHY: ICD-10-PCS | Mod: 22,LT,, | Performed by: ORTHOPAEDIC SURGERY

## 2023-03-21 PROCEDURE — 71000039 HC RECOVERY, EACH ADD'L HOUR: Performed by: ORTHOPAEDIC SURGERY

## 2023-03-21 PROCEDURE — 29827 SHO ARTHRS SRG RT8TR CUF RPR: CPT | Mod: 51,LT,, | Performed by: ORTHOPAEDIC SURGERY

## 2023-03-21 PROCEDURE — 99900035 HC TECH TIME PER 15 MIN (STAT)

## 2023-03-21 PROCEDURE — 29806 SHO ARTHRS SRG CAPSULORRAPHY: CPT | Mod: 22,LT,, | Performed by: ORTHOPAEDIC SURGERY

## 2023-03-21 PROCEDURE — C1713 ANCHOR/SCREW BN/BN,TIS/BN: HCPCS | Performed by: ORTHOPAEDIC SURGERY

## 2023-03-21 PROCEDURE — 37000009 HC ANESTHESIA EA ADD 15 MINS: Performed by: ORTHOPAEDIC SURGERY

## 2023-03-21 PROCEDURE — 36000711: Performed by: ORTHOPAEDIC SURGERY

## 2023-03-21 PROCEDURE — 71000033 HC RECOVERY, INTIAL HOUR: Performed by: ORTHOPAEDIC SURGERY

## 2023-03-21 PROCEDURE — 25000003 PHARM REV CODE 250: Performed by: PHYSICIAN ASSISTANT

## 2023-03-21 PROCEDURE — 29807 PR SHLDR ARTHROSCOP,SURG,REPAIR,SLAP LESION: ICD-10-PCS | Mod: 59,LT,, | Performed by: ORTHOPAEDIC SURGERY

## 2023-03-21 PROCEDURE — 37000008 HC ANESTHESIA 1ST 15 MINUTES: Performed by: ORTHOPAEDIC SURGERY

## 2023-03-21 PROCEDURE — D9220A PRA ANESTHESIA: ICD-10-PCS | Mod: CRNA,,, | Performed by: NURSE ANESTHETIST, CERTIFIED REGISTERED

## 2023-03-21 PROCEDURE — 36000710: Performed by: ORTHOPAEDIC SURGERY

## 2023-03-21 PROCEDURE — 94761 N-INVAS EAR/PLS OXIMETRY MLT: CPT

## 2023-03-21 PROCEDURE — 27201423 OPTIME MED/SURG SUP & DEVICES STERILE SUPPLY: Performed by: ORTHOPAEDIC SURGERY

## 2023-03-21 DEVICE — ANCHOR SUT FIBERTAK 1.8 KNTLS: Type: IMPLANTABLE DEVICE | Site: SHOULDER | Status: FUNCTIONAL

## 2023-03-21 DEVICE — ANCHOR SUTURETAK 3MM BC: Type: IMPLANTABLE DEVICE | Site: SHOULDER | Status: FUNCTIONAL

## 2023-03-21 RX ORDER — TRAMADOL HYDROCHLORIDE 50 MG/1
100 TABLET ORAL EVERY 6 HOURS PRN
Status: DISCONTINUED | OUTPATIENT
Start: 2023-03-21 | End: 2023-03-21 | Stop reason: HOSPADM

## 2023-03-21 RX ORDER — OXYCODONE HCL 10 MG/1
10 TABLET, FILM COATED, EXTENDED RELEASE ORAL
Status: DISCONTINUED | OUTPATIENT
Start: 2023-03-22 | End: 2023-03-21

## 2023-03-21 RX ORDER — PREGABALIN 75 MG/1
75 CAPSULE ORAL ONCE
Status: COMPLETED | OUTPATIENT
Start: 2023-03-21 | End: 2023-03-21

## 2023-03-21 RX ORDER — PREGABALIN 75 MG/1
75 CAPSULE ORAL
Status: COMPLETED | OUTPATIENT
Start: 2023-03-21 | End: 2023-03-21

## 2023-03-21 RX ORDER — PROPOFOL 10 MG/ML
VIAL (ML) INTRAVENOUS
Status: DISCONTINUED | OUTPATIENT
Start: 2023-03-21 | End: 2023-03-21

## 2023-03-21 RX ORDER — FENTANYL CITRATE 50 UG/ML
INJECTION, SOLUTION INTRAMUSCULAR; INTRAVENOUS
Status: DISCONTINUED | OUTPATIENT
Start: 2023-03-21 | End: 2023-03-21

## 2023-03-21 RX ORDER — HALOPERIDOL 5 MG/ML
0.5 INJECTION INTRAMUSCULAR EVERY 10 MIN PRN
Status: DISCONTINUED | OUTPATIENT
Start: 2023-03-21 | End: 2023-03-21 | Stop reason: HOSPADM

## 2023-03-21 RX ORDER — KETAMINE HYDROCHLORIDE 100 MG/ML
INJECTION, SOLUTION INTRAMUSCULAR; INTRAVENOUS
Status: DISCONTINUED | OUTPATIENT
Start: 2023-03-21 | End: 2023-03-21 | Stop reason: HOSPADM

## 2023-03-21 RX ORDER — ROPIVACAINE HYDROCHLORIDE 5 MG/ML
INJECTION, SOLUTION EPIDURAL; INFILTRATION; PERINEURAL
Status: DISCONTINUED | OUTPATIENT
Start: 2023-03-21 | End: 2023-03-21 | Stop reason: HOSPADM

## 2023-03-21 RX ORDER — DEXMEDETOMIDINE HYDROCHLORIDE 100 UG/ML
INJECTION, SOLUTION INTRAVENOUS
Status: DISCONTINUED | OUTPATIENT
Start: 2023-03-21 | End: 2023-03-21

## 2023-03-21 RX ORDER — PREGABALIN 75 MG/1
75 CAPSULE ORAL
Status: DISCONTINUED | OUTPATIENT
Start: 2023-03-22 | End: 2023-03-21

## 2023-03-21 RX ORDER — ONDANSETRON 2 MG/ML
4 INJECTION INTRAMUSCULAR; INTRAVENOUS EVERY 12 HOURS PRN
Status: DISCONTINUED | OUTPATIENT
Start: 2023-03-21 | End: 2023-03-21 | Stop reason: HOSPADM

## 2023-03-21 RX ORDER — DEXAMETHASONE SODIUM PHOSPHATE 4 MG/ML
INJECTION, SOLUTION INTRA-ARTICULAR; INTRALESIONAL; INTRAMUSCULAR; INTRAVENOUS; SOFT TISSUE
Status: DISCONTINUED | OUTPATIENT
Start: 2023-03-21 | End: 2023-03-21

## 2023-03-21 RX ORDER — SODIUM CHLORIDE 0.9 % (FLUSH) 0.9 %
10 SYRINGE (ML) INJECTION
Status: DISCONTINUED | OUTPATIENT
Start: 2023-03-21 | End: 2023-03-21 | Stop reason: HOSPADM

## 2023-03-21 RX ORDER — NEOSTIGMINE METHYLSULFATE 0.5 MG/ML
INJECTION, SOLUTION INTRAVENOUS
Status: DISCONTINUED | OUTPATIENT
Start: 2023-03-21 | End: 2023-03-21

## 2023-03-21 RX ORDER — SODIUM CHLORIDE 9 MG/ML
INJECTION, SOLUTION INTRAVENOUS CONTINUOUS
Status: DISCONTINUED | OUTPATIENT
Start: 2023-03-22 | End: 2023-03-21 | Stop reason: HOSPADM

## 2023-03-21 RX ORDER — KETOROLAC TROMETHAMINE 30 MG/ML
INJECTION, SOLUTION INTRAMUSCULAR; INTRAVENOUS
Status: DISCONTINUED | OUTPATIENT
Start: 2023-03-21 | End: 2023-03-21 | Stop reason: HOSPADM

## 2023-03-21 RX ORDER — HYDROMORPHONE HYDROCHLORIDE 1 MG/ML
0.2 INJECTION, SOLUTION INTRAMUSCULAR; INTRAVENOUS; SUBCUTANEOUS EVERY 5 MIN PRN
Status: DISCONTINUED | OUTPATIENT
Start: 2023-03-21 | End: 2023-03-21 | Stop reason: HOSPADM

## 2023-03-21 RX ORDER — ROCURONIUM BROMIDE 10 MG/ML
INJECTION, SOLUTION INTRAVENOUS
Status: DISCONTINUED | OUTPATIENT
Start: 2023-03-21 | End: 2023-03-21

## 2023-03-21 RX ORDER — FENTANYL CITRATE 50 UG/ML
25 INJECTION, SOLUTION INTRAMUSCULAR; INTRAVENOUS EVERY 5 MIN PRN
Status: DISCONTINUED | OUTPATIENT
Start: 2023-03-21 | End: 2023-03-21 | Stop reason: HOSPADM

## 2023-03-21 RX ORDER — OXYCODONE HCL 10 MG/1
10 TABLET, FILM COATED, EXTENDED RELEASE ORAL
Status: COMPLETED | OUTPATIENT
Start: 2023-03-21 | End: 2023-03-21

## 2023-03-21 RX ORDER — CLINDAMYCIN PHOSPHATE 900 MG/50ML
900 INJECTION, SOLUTION INTRAVENOUS
Status: DISCONTINUED | OUTPATIENT
Start: 2023-03-21 | End: 2023-03-21 | Stop reason: HOSPADM

## 2023-03-21 RX ORDER — PROMETHAZINE HYDROCHLORIDE 25 MG/1
25 TABLET ORAL EVERY 6 HOURS PRN
Status: DISCONTINUED | OUTPATIENT
Start: 2023-03-21 | End: 2023-03-21 | Stop reason: HOSPADM

## 2023-03-21 RX ORDER — CARBOXYMETHYLCELLULOSE SODIUM 5 MG/ML
SOLUTION/ DROPS OPHTHALMIC
Status: DISCONTINUED | OUTPATIENT
Start: 2023-03-21 | End: 2023-03-21

## 2023-03-21 RX ORDER — MIDAZOLAM HYDROCHLORIDE 1 MG/ML
INJECTION, SOLUTION INTRAMUSCULAR; INTRAVENOUS
Status: DISCONTINUED | OUTPATIENT
Start: 2023-03-21 | End: 2023-03-21

## 2023-03-21 RX ORDER — ONDANSETRON 2 MG/ML
INJECTION INTRAMUSCULAR; INTRAVENOUS
Status: DISCONTINUED | OUTPATIENT
Start: 2023-03-21 | End: 2023-03-21

## 2023-03-21 RX ORDER — EPINEPHRINE 1 MG/ML
INJECTION, SOLUTION, CONCENTRATE INTRAVENOUS
Status: DISCONTINUED | OUTPATIENT
Start: 2023-03-21 | End: 2023-03-21 | Stop reason: HOSPADM

## 2023-03-21 RX ORDER — MORPHINE SULFATE 2 MG/ML
2 INJECTION, SOLUTION INTRAMUSCULAR; INTRAVENOUS EVERY 10 MIN PRN
Status: DISCONTINUED | OUTPATIENT
Start: 2023-03-21 | End: 2023-03-21 | Stop reason: HOSPADM

## 2023-03-21 RX ORDER — KETAMINE HCL IN 0.9 % NACL 50 MG/5 ML
SYRINGE (ML) INTRAVENOUS
Status: DISCONTINUED | OUTPATIENT
Start: 2023-03-21 | End: 2023-03-21

## 2023-03-21 RX ORDER — OXYCODONE HYDROCHLORIDE 5 MG/1
10 TABLET ORAL EVERY 4 HOURS PRN
Status: DISCONTINUED | OUTPATIENT
Start: 2023-03-21 | End: 2023-03-21 | Stop reason: HOSPADM

## 2023-03-21 RX ORDER — CLINDAMYCIN PHOSPHATE 900 MG/50ML
INJECTION, SOLUTION INTRAVENOUS
Status: DISCONTINUED | OUTPATIENT
Start: 2023-03-21 | End: 2023-03-21

## 2023-03-21 RX ORDER — LIDOCAINE HYDROCHLORIDE 20 MG/ML
INJECTION, SOLUTION EPIDURAL; INFILTRATION; INTRACAUDAL; PERINEURAL
Status: DISCONTINUED | OUTPATIENT
Start: 2023-03-21 | End: 2023-03-21

## 2023-03-21 RX ADMIN — OXYCODONE HYDROCHLORIDE 10 MG: 10 TABLET, FILM COATED, EXTENDED RELEASE ORAL at 09:03

## 2023-03-21 RX ADMIN — GLYCOPYRROLATE 0.4 MG: 0.2 INJECTION, SOLUTION INTRAMUSCULAR; INTRAVENOUS at 12:03

## 2023-03-21 RX ADMIN — MIDAZOLAM 2 MG: 1 INJECTION INTRAMUSCULAR; INTRAVENOUS at 10:03

## 2023-03-21 RX ADMIN — ROCURONIUM BROMIDE 50 MG: 10 INJECTION INTRAVENOUS at 10:03

## 2023-03-21 RX ADMIN — LIDOCAINE HYDROCHLORIDE 100 MG: 20 INJECTION, SOLUTION EPIDURAL; INFILTRATION; INTRACAUDAL at 10:03

## 2023-03-21 RX ADMIN — DEXMEDETOMIDINE 12 MCG: 100 INJECTION, SOLUTION, CONCENTRATE INTRAVENOUS at 10:03

## 2023-03-21 RX ADMIN — Medication 10 MG: at 10:03

## 2023-03-21 RX ADMIN — FENTANYL CITRATE 50 MCG: 50 INJECTION INTRAMUSCULAR; INTRAVENOUS at 11:03

## 2023-03-21 RX ADMIN — OXYCODONE HYDROCHLORIDE 10 MG: 5 TABLET ORAL at 02:03

## 2023-03-21 RX ADMIN — NEOSTIGMINE METHYLSULFATE 4 MG: 0.5 INJECTION, SOLUTION INTRAVENOUS at 12:03

## 2023-03-21 RX ADMIN — SODIUM CHLORIDE, SODIUM GLUCONATE, SODIUM ACETATE, POTASSIUM CHLORIDE, MAGNESIUM CHLORIDE, SODIUM PHOSPHATE, DIBASIC, AND POTASSIUM PHOSPHATE: .53; .5; .37; .037; .03; .012; .00082 INJECTION, SOLUTION INTRAVENOUS at 11:03

## 2023-03-21 RX ADMIN — PREGABALIN 75 MG: 75 CAPSULE ORAL at 09:03

## 2023-03-21 RX ADMIN — ONDANSETRON 4 MG: 2 INJECTION, SOLUTION INTRAMUSCULAR; INTRAVENOUS at 12:03

## 2023-03-21 RX ADMIN — FENTANYL CITRATE 100 MCG: 50 INJECTION INTRAMUSCULAR; INTRAVENOUS at 10:03

## 2023-03-21 RX ADMIN — PROPOFOL 200 MG: 10 INJECTION, EMULSION INTRAVENOUS at 10:03

## 2023-03-21 RX ADMIN — DEXAMETHASONE SODIUM PHOSPHATE 8 MG: 4 INJECTION INTRA-ARTICULAR; INTRALESIONAL; INTRAMUSCULAR; INTRAVENOUS; SOFT TISSUE at 11:03

## 2023-03-21 RX ADMIN — Medication 20 MG: at 10:03

## 2023-03-21 RX ADMIN — CLINDAMYCIN PHOSPHATE 900 MG: 900 INJECTION, SOLUTION INTRAVENOUS at 10:03

## 2023-03-21 RX ADMIN — SODIUM CHLORIDE: 0.9 INJECTION, SOLUTION INTRAVENOUS at 09:03

## 2023-03-21 RX ADMIN — HALOPERIDOL LACTATE 0.5 MG: 5 INJECTION, SOLUTION INTRAMUSCULAR at 02:03

## 2023-03-21 RX ADMIN — CARBOXYMETHYLCELLULOSE SODIUM 2 DROP: 5 SOLUTION/ DROPS OPHTHALMIC at 10:03

## 2023-03-21 RX ADMIN — PREGABALIN 75 MG: 75 CAPSULE ORAL at 02:03

## 2023-03-21 NOTE — TRANSFER OF CARE
"Anesthesia Transfer of Care Note    Patient: Dieudonne Martin    Procedure(s) Performed: Procedure(s) (LRB):  ARTHROSCOPY, SHOULDER, WITH BANKART REPAIR (Left)  CAPSULORRHAPHY (Left)  REPAIR, SLAP LESION, SHOULDER (Left)  DEBRIDEMENT, SHOULDER, ARTHROSCOPIC (Left)    Patient location: PACU    Anesthesia Type: general    Transport from OR: Transported from OR on 6-10 L/min O2 by face mask with adequate spontaneous ventilation    Post pain: adequate analgesia    Post assessment: no apparent anesthetic complications    Post vital signs: stable    Level of consciousness: sedated    Nausea/Vomiting: no nausea/vomiting    Complications: none    Transfer of care protocol was followed      Last vitals:   Visit Vitals  BP (!) 158/72 (BP Location: Right arm, Patient Position: Lying)   Pulse 65   Temp 37.3 °C (99.1 °F) (Oral)   Resp 17   Ht 6' 8" (2.032 m)   Wt 106.1 kg (234 lb)   SpO2 100%   BMI 25.71 kg/m²     "

## 2023-03-21 NOTE — ANESTHESIA PREPROCEDURE EVALUATION
2023  Dieudonne Martin is a 17 y.o., male.    Pre-operative evaluation for Procedure(s) (LRB):  ARTHROSCOPY, SHOULDER, WITH BANKART REPAIR (Left)  CAPSULORRHAPHY (Left)  REPAIR, SLAP LESION, SHOULDER (Left)  FIXATION, TENDON (Left)  DEBRIDEMENT, SHOULDER, ARTHROSCOPIC (Left)    Dieudonne Martin is a 17 y.o. male     Patient Active Problem List   Diagnosis    Decreased range of motion of left shoulder    Acute pain of left shoulder    Scapular dyskinesis       Review of patient's allergies indicates:  No Known Allergies    No current facility-administered medications on file prior to encounter.     Current Outpatient Medications on File Prior to Encounter   Medication Sig Dispense Refill    meloxicam (MOBIC) 15 MG tablet Take 1 tablet (15 mg total) by mouth once daily. 30 tablet 0       History reviewed. No pertinent surgical history.    Social History     Socioeconomic History    Marital status: Single   Tobacco Use    Smoking status: Never    Smokeless tobacco: Never   Substance and Sexual Activity    Alcohol use: Yes    Drug use: Never    Sexual activity: Yes         CBC: No results for input(s): WBC, RBC, HGB, HCT, PLT, MCV, MCH, MCHC in the last 72 hours.    CMP: No results for input(s): NA, K, CL, CO2, BUN, CREATININE, GLU, MG, PHOS, CALCIUM, ALBUMIN, PROT, ALKPHOS, ALT, AST, BILITOT in the last 72 hours.    INR  No results for input(s): PT, INR, PROTIME, APTT in the last 72 hours.        Diagnostic Studies:      EKD Echo:  No results found for this or any previous visit.        Pre-op Assessment    I have reviewed the Patient Summary Reports.    I have reviewed the NPO Status.   I have reviewed the Medications.     Review of Systems  Anesthesia Hx:  No previous Anesthesia   Denies Personal Hx of Anesthesia complications.   Cardiovascular:   Exercise tolerance: good     Pulmonary:  Pulmonary Normal    Renal/:  Renal/ Normal     Hepatic/GI:  Hepatic/GI Normal    Neurological:   Denies CVA. Denies Seizures.        Physical Exam  General: Cooperative, Alert and Oriented    Airway:  Mallampati: II / I  Mouth Opening: Normal  TM Distance: Normal  Tongue: Normal  Neck ROM: Normal ROM    Dental:  Intact        Anesthesia Plan  Type of Anesthesia, risks & benefits discussed:    Anesthesia Type: Gen ETT  Intra-op Monitoring Plan: Standard ASA Monitors  Post Op Pain Control Plan: multimodal analgesia and IV/PO Opioids PRN  Induction:  IV  Airway Plan: Video, Post-Induction  Informed Consent: Informed consent signed with the Patient and all parties understand the risks and agree with anesthesia plan.  All questions answered.   ASA Score: 1  Day of Surgery Review of History & Physical: H&P Update referred to the surgeon/provider.    Ready For Surgery From Anesthesia Perspective.     .

## 2023-03-21 NOTE — BRIEF OP NOTE
[place] - Surgery ([facility])  Brief Operative Note    Surgery Date: 3/21/2023     Surgeon(s) and Role:     * Tiffani Snow MD - Primary    Assisting Surgeon: None    Pre-op Diagnosis:  Instability of left shoulder joint [M25.312]  Biceps tendonitis on left [M75.22]  SLAP lesion of left shoulder [S43.432A]    Post-op Diagnosis:  Post-Op Diagnosis Codes:     * Instability of left shoulder joint [M25.312]     * Biceps tendonitis on left [M75.22]     * SLAP lesion of left shoulder [S43.432A]    Procedure(s) (LRB):  ARTHROSCOPY, SHOULDER, WITH BANKART REPAIR (Left)  CAPSULORRHAPHY (Left)  REPAIR, SLAP LESION, SHOULDER (Left)  DEBRIDEMENT, SHOULDER, ARTHROSCOPIC (Left)    Anesthesia: General    Operative Findings: see op note    Estimated Blood Loss: * No values recorded between 3/21/2023 11:23 AM and 3/21/2023  1:08 PM *         Specimens:   Specimen (24h ago, onward)      None              Discharge Note    OUTCOME: Patient tolerated treatment/procedure well without complication and is now ready for discharge.    DISPOSITION: Home or Self Care    FINAL DIAGNOSIS:  <principal problem not specified>    FOLLOWUP: In clinic    DISCHARGE INSTRUCTIONS:  No discharge procedures on file.

## 2023-03-21 NOTE — ANESTHESIA PROCEDURE NOTES
Intubation    Date/Time: 3/21/2023 10:53 AM  Performed by: Dhruv Salcedo CRNA  Authorized by: Shiva Arredondo MD     Intubation:     Induction:  Intravenous    Intubated:  Postinduction    Mask Ventilation:  Easy mask    Attempts:  1    Attempted By:  CRNA    Method of Intubation:  Direct    Blade:  Antonio 2    Laryngeal View Grade: Grade I - full view of cords      Difficult Airway Encountered?: No      Complications:  None    Airway Device:  Oral endotracheal tube    Airway Device Size:  8.0    Style/Cuff Inflation:  Cuffed (inflated to minimal occlusive pressure)    Inflation Amount (mL):  5    Tube secured:  24    Secured at:  The lips    Placement Verified By:  Capnometry    Complicating Factors:  None    Findings Post-Intubation:  BS equal bilateral and atraumatic/condition of teeth unchanged

## 2023-03-21 NOTE — OPERATIVE NOTE ADDENDUM
Certification of Assistant at Surgery       Surgery Date: 3/21/2023     Participating Surgeons:  Surgeon(s) and Role:     * Tiffani Snow MD - Primary    Procedures:  Procedure(s) (LRB):  ARTHROSCOPY, SHOULDER, WITH BANKART REPAIR (Left)  CAPSULORRHAPHY (Left)  REPAIR, SLAP LESION, SHOULDER (Left)  DEBRIDEMENT, SHOULDER, ARTHROSCOPIC (Left)    Assistant Surgeon's Certification of Necessity:  I understand that section 1842 (b) (6) (d) of the Social Security Act generally prohibits Medicare Part B reasonable charge payment for the services of assistants at surgery in teaching hospitals when qualified residents are available to furnish such services. I certify that the services for which payment is claimed were medically necessary, and that no qualified resident was available to perform the services. I further understand that these services are subject to post-payment review by the Medicare carrier.      Joel White MD    03/21/2023  1:12 PM

## 2023-03-21 NOTE — ANESTHESIA POSTPROCEDURE EVALUATION
Anesthesia Post Evaluation    Patient: Dieudonne Martin    Procedure(s) Performed: Procedure(s) (LRB):  ARTHROSCOPY, SHOULDER, WITH BANKART REPAIR (Left)  CAPSULORRHAPHY (Left)  REPAIR, SLAP LESION, SHOULDER (Left)  DEBRIDEMENT, SHOULDER, ARTHROSCOPIC (Left)    Final Anesthesia Type: general      Patient location during evaluation: PACU  Patient participation: Yes- Able to Participate  Level of consciousness: awake and alert and oriented  Post-procedure vital signs: reviewed and stable  Pain management: adequate  Airway patency: patent    PONV status at discharge: No PONV  Anesthetic complications: no      Cardiovascular status: blood pressure returned to baseline and hemodynamically stable  Respiratory status: unassisted, room air and spontaneous ventilation  Hydration status: euvolemic  Follow-up not needed.          Vitals Value Taken Time   /70 03/21/23 1432   Temp 36.7 °C (98.1 °F) 03/21/23 1430   Pulse 63 03/21/23 1445   Resp 21 03/21/23 1445   SpO2 99 % 03/21/23 1445   Vitals shown include unvalidated device data.      Event Time   Out of Recovery 14:14:00         Pain/Laurie Score: Pain Rating Prior to Med Admin: 4 (3/21/2023  2:40 PM)  Pain Rating Post Med Admin: 5 (3/21/2023  2:22 PM)  Laurie Score: 10 (3/21/2023  1:51 PM)

## 2023-03-21 NOTE — PROGRESS NOTES
Pre op completed. All concerns addressed. Patient belongings to be given to . Bed in lowest position. Call light within reach. [place] at beside.

## 2023-03-21 NOTE — PLAN OF CARE
VSS. Pt able to tolerate oral liquids. Pt reports tolerable pain level for D/C and states he is ready to get dressed and go home. Ice packs and dressing intact. Trainer has polar ice sleeve. Thigh TEDs intact. Shopiere and cousin received home meds per bedside delivery. No distress noted. Pt states he is ready for D/C. D/C instructions reviewed with pt, cousin, and , verbalized understanding.

## 2023-03-22 NOTE — OP NOTE
DATE OF PROCEDURE: 03/21/2023     SURGEON: Tiffani Snow M.D.    ASSISTANT: ELINA White M.D.,PGY 6  ASSISTANT: SMA Karen      PREOPERATIVE DIAGNOSES:   left  1. Shoulder anterior instability  2. Shoulder posterior instability  3. Shoulder synovitis  4. Shoulder SLAP type 8  5. Shoulder adhesions  6. Shoulder rotator interval lesion, multi-directional instability    POSTOPERATIVE DIAGNOSES:   left  1. Shoulder anterior instability  2. Shoulder posterior instability  3. Shoulder synovitis  4. Shoulder SLAP type 8  5. Shoulder adhesions  6. Shoulder rotator interval lesion, multi-directional instability    OPERATION:   left  1. Shoulder arthroscopic anterior capsulorrhaphy (CPT 57411) (complex, -22 modifier)  2. Shoulder arthroscopic anterior labral repair (CPT 29374)   3. Shoulder arthroscopic posterior labral repair, capsulorrhaphy, 7:00 position (CPT 16706)  4. Shoulder arthroscopic extensive debridement (anterior, posterior glenohumeral joint) (CPT 20992)  5. Shoulder manipulation under anesthesia (CPT 54714)  6. Shoulder arthroscopic lysis of adhesions (CPT 00649):  7. Shoulder arthroscopic SLAP type 8 repair (CPT 33765)  8. Shoulder arthroscopic rotator cuff repair (rotator interval closure, supraspinatus to subscapularis, CPT 72325)     ANESTHESIA:  General with intra-op suprascapular nerve block    BLOOD LOSS: Minimal.     DRAINS: None.     TOURNIQUET TIME: None.     COMPLICATIONS: None. The patient was moved to the recovery room in stable condition with compartments soft and cap refill less than a second in all digits.      COMPLEX PROCEDURE:  Labral repair and capsulorrhaphy was complex in nature due to the chronic nature of this case and remnant labral tissue lesion tissue and altered anatomy of this case.  There was an altered surgical field with abnormal anatomy.   There was an altered surgical field due to the complex labral tear. There was abnormal anatomy. Complexity of the service was much greater  than the normative procedure. There was increased time, intensity and technical difficulty of the procedure, severity of the patient's condition and mental effort required.  This was a highly complicated repair and tear pattern that required advanced arthroscopic skill in order to safely and technically perform it. Nevertheless the repair achieved was excellent    BRIEF INDICATION OF MEDICAL NECESSITY: The patient is a 13 y.o. year-old male who has history and physical examination findings consistent with the above. Nonoperative versus operative options were discussed. The risks and benefits were discussed with the patient. The patient acknowledged understanding and wished to proceed with operative intervention. Informed consent was obtained prior to the procedure. Reasonable expectations and potential complications were discussed and acknowledged, including but not limited to infection, bleeding, blood clots, (DVT and/or PE), nerve injury, re-tear, instability, continued pain and stiffness. They agreed and   understood and wished to proceed.     EXAMINATION UNDER ANESTHESIA OF THE left SHOULDER: Forward elevation 175 degrees, External rotation at 0-60 degrees, External rotation at 90-90 degrees, Internal rotation at 90-60 degrees. Translation testing: anterior grade 3, posterior grade 1, sulcus sign grade 3 corrects to 2 on external rotation.     EXAMINATION UNDER ANESTHESIA OF THE NONOPERATED right SHOULDER: Forward elevation 175 degrees, External rotation at 0-60 degrees, External rotation at 0-90 degrees, Internal rotation at 90-60 degrees. Translation testing: anterior grade 2, posterior grade 1, sulcus sign grade 2 corrects to 1 on external rotation.      PROCEDURE IN DETAIL:  After the correct operative site was marked by the operating surgeon. The patient was then taken to the operating room and placed supine on the operating room table, where the patient  underwent general anesthesia by the anesthesia team.   The patient was then rolled into the lateral decubitus position with the operative side up.  A well-padded axillary roll, beanbag and pillows were placed.  All pressure points were carefully padded and checked.  The upper extremities and both lower extremities were placed in comfortable positions and were also well-padded.  The operative upper extremity was then prepped and draped in the usual sterile fashion.     Suprascapular nerve block was performed in the standard fashion with 5cc local anesthetic.    The Spider arm positioner was implemented with balanced suspension and appropriate landmarks were noted on the skin. A posterior followed by chichi-superior portals were created and systematic examination of the joint revealed the following:      There was no evidence of chondral damage:     There was a SLAP type 8 lesion noted to the biceps root upon probing and careful inspection.      COMPLEX PROCEDURE:  Labral repair and capsulorrhaphy was complex in nature due to the chronic nature of this case and remnant labral tissue lesion tissue and altered anatomy of this case.  There was an altered surgical field with abnormal anatomy.   There was an altered surgical field due to the complex labral tear. There was abnormal anatomy. Complexity of the service was much greater than the normative procedure. There was increased time, intensity and technical difficulty of the procedure, severity of the patient's condition and mental effort required.  This was a highly complicated repair and tear pattern that required advanced arthroscopic skill in order to safely and technically perform it. Nevertheless the repair achieved was excellent     Bankart lesion was visualized anteroinferiorly, which extended posteriorly.  Liberator knife was used to elevate the anteroinferior and posteroinferior tissues. Care was taken on liberation of this tissue. Capsule anteriorly and posteriorly was patulous in nature. Capsule was stretched  somewhat. Anteroinferior portal was created just superior to the subscapularis in the appropriate more vertical angle.      Adhesions were cleared off labrum anteriorly and capsule was able to be mobilized after lysis of adhesions.     Cannulas were placed and a shaver was then used to roughen and debride the surface of the anterior, inferior, and posterior glenoid neck surfaces. Then 1-2 mm of articular surface was debrided for glenoid anchor placement.      There was synovitis in the shoulder anteriorly and posteriorly and was debrided anteriorly and posteriorly in the glenohumeral joint to the areas of concern.    An inferior transcutaneous 3-mm portal was created to place the inferior anchors. Care was taken to avoid any damage to the neurovascular structures, axillary nerve, below.      8 anchors were placed, one at the 7:00, 8:00, 9:00 positions, anteriorly at 5 o'clock, 4 o'clock, and 3 o'clock positions.      These were 3.0 Arthrex Bio-Composite Suture Tack and a simple suture was placed through the labrum and capsule, and labral repair and capsulorrhaphy was performed. Care was taken to avoid the neurovascular structures and axillary nerve bundles below. Suture was then tied using modified Kareem knots with the knots from the tissue side. All knots had good loop and knot security with repair starting posteriorly and then proceeding anteriorly.   Bone fragments anteriorly were incorporated into the anterior soft tissue repair.     The repair recentered the humeral head nicely on the glenoid. The shoulder was stable on translation testing.    A medial as possible transcutaneous 3-mm portal was created to place the 2 superior SLAP type 8 anchors, 1 at the 10:00 and 11:00 position. This was 1.6 mm Arthrex FiberTak soft anchor x 2 and a simple knotless suture was placed with repair posteriorly.  This gave 2 sutures holding the SLAP repair and the labrum was secured and was probed and found to be quite stable.       9 anchors were used in total.     This recentered the humeral head nicely on the glenoid. The shoulder was stable to translation testing.      Rotator interval closure was performed in standard fashion x 2 4.5mm Arthrex corkscrew anchors, 2 mattress sutures, supraspinatus to subscapularis, completing the side to side cuff repair.     Local 5cc placed in to the joint along with suprascapular nerve block was performed in the standard fashion also with 5cc local anesthetic. Local was placed about portals and incision(s) after irrigation, as described below, was completed. The shoulder was then irrigated and fluid was extravasated using suction. All portals were reapproximated using inverted 4-0 Monocryl suture in the subcutaneous tissue of the portals. Mastisol and Steri-strips were placed with xeroform, 4x4s, abd pad, and Medi-pore tape.  TENS unit pads were placed which were medically necessary for pain relief.  An iceman was secured in the shoulder dempsey.  A sling with an abduction pillow was secured.  The patient was then moved to supine, extubated and taken to the recovery room where the patient arrived in stable condition with the compartments of the arm and forearm soft and cap refill less than a second in all digits.     POST OPERATIVE PLAN: We will follow the arthroscopic Bankart repair and SLAP repair guidelines. We discussed with the patient's family after surgery. The patient will remain in a sling for 6 weeks. We will start PT at the 3 week wandy.     Quality of tissue: Good    Quality of the repair: Good

## 2023-03-30 ENCOUNTER — ATHLETIC TRAINING SESSION (OUTPATIENT)
Dept: SPORTS MEDICINE | Facility: CLINIC | Age: 18
End: 2023-03-30
Payer: COMMERCIAL

## 2023-03-30 NOTE — PROGRESS NOTES
Dieudonne completed:    [x]  INJURY TREATMENT   []  MAINTENANCE  DATE OF SERVICE: 03/29/2023  INJURY/CONDITON: shoulder labral repair post-op    Dieudonne received the selected modalities after being cleared for contradictions.  Dieudonne received education on potenital side effects of the selected modalities and agreed to treatment.      MODALITIES:    Cryotherapy / Thermotherapy Duration  (Mins) Add. Tx Parameters / Comment   []Cold Tub / Whirlpool (50-60 F)     []Contrast Bath (105-110 F & 50-65 F)     [x]Game Ready 20 min    []Hot Pack     []Hot Tub / Whirlpool ( F)     []Ice Massage     []Ice Pack     []Paraffin Wax (126-130 F)     []Vapocoolant Spray        Comment:       Electrotherapy Waveform   (AC/DC) Modulation (Cont./Interrupted/Surged) Intensity   (V) Pulse Width/Dur.  (uS) Pulse Rate/Freq.  (Hz, PPS or CPS) Duration  (Mins) Add. Tx Parameters / Comment   []Combo          [x]E-Stim - IFC   16.5   20 min    []E-Stim - Premod          []E-Stim - [place]          []E-Stim - TENS          []E-Stim - Other          []Iontophoresis        Meds:     Comment:      Ultrasound Duty Cycle   (%) Freq.  (Mhz) Intensity   (w/cm2) Duration  (Mins) Add. Tx Parameters / Comment   []Combo        []Phonophoresis     Meds:   []Ultrasound         []Ultrasound and E-Stim          Comment:        Massage Duration  (Mins) Add. Tx Parameters / Comment   [x]Massage - IASTM 5 minutes Upper traps   []Massage - Scar Tissue     []Massage - Self Administered     []Massage - Therapeutic     []Myofascial Release        Comment:      Other Modalities Duration  (Mins)  Add. Tx Parameters / Comment   []Active Release     []Cupping     []Dry Needling     []Intermittent Compression      []Laser     []Lightwave     []Traction      []Other:       Comment:      THERAPEUTIC EXERCISES:    Stretching Cardio Rehab Other   []Stretching - Active [x]Cardio - Bike []Rehab - Ankle/Foot []Agility []PNF   []Stretching - Dynamic []Cardio - Elliptical  []Rehab - Knee []Balance []ROM - Active   []Stretching - Passive []Cardio - Jog/Run []Rehab - Hip []Blood Flow Restriction [x]ROM - Passive   []Stretching - PNF []Cardio - Treadmill []Rehab - Wrist/Hand []Foam Roller []RTP - Concussion Protocol   []Stretching - Static []Cardio - Upper Body Ergometer []Rehab - Elbow [x]Functional Exercises []RTP - Sport Specific    []Cardio - Walk [x]Rehab - Shoulder []Joint Mobilization []Strengthening Exercises     []Rehab - Neck/Spine []Manual Therapy []Other:     []Rehab - Back []Plyometric Exercises      []Rehab - Other       Comment:            Warm-Up Reps/Sets/Time Weight #   Elbow flexion/extension 3x15    Shoulder shrugs 3x15    Shoulder Protraction/retraction 3x15           Exercise Reps/Sets/Time Weight #   BW squats 3x15    Banded lateral walks 2 trips    Banded monster walks 2 trips    Banded lateral foot taps 2x15    Calf raises 3x15-20                               Comment:      Miscellaneous Add. Tx Parameters / Comment   []Compression Wrap    []Support Wrap    []Taping - Preventative    []Taping - Injured Part    [x]Wound Care Changed bandage from surgery    []Other:      Comment:

## 2023-04-04 ENCOUNTER — ATHLETIC TRAINING SESSION (OUTPATIENT)
Dept: SPORTS MEDICINE | Facility: CLINIC | Age: 18
End: 2023-04-04
Payer: COMMERCIAL

## 2023-04-04 NOTE — PROGRESS NOTES
Dieudonne completed:    [x]  INJURY TREATMENT   []  MAINTENANCE  DATE OF SERVICE: 04/04  INJURY/CONDITON: post-op Left shoulder    Dieudonne received the selected modalities after being cleared for contradictions.  Dieudonne received education on potenital side effects of the selected modalities and agreed to treatment.      MODALITIES:    Cryotherapy / Thermotherapy Duration  (Mins) Add. Tx Parameters / Comment   []Cold Tub / Whirlpool (50-60 F)     []Contrast Bath (105-110 F & 50-65 F)     []Game Ready     []Hot Pack     []Hot Tub / Whirlpool ( F)     []Ice Massage     []Ice Pack     []Paraffin Wax (126-130 F)     []Vapocoolant Spray        Comment:       Electrotherapy Waveform   (AC/DC) Modulation (Cont./Interrupted/Surged) Intensity   (V) Pulse Width/Dur.  (uS) Pulse Rate/Freq.  (Hz, PPS or CPS) Duration  (Mins) Add. Tx Parameters / Comment   []Combo          [x]E-Stim - IFC          []E-Stim - Premod          []E-Stim - Palestinian          []E-Stim - TENS          []E-Stim - Other          []Iontophoresis        Meds:     Comment:      Ultrasound Duty Cycle   (%) Freq.  (Mhz) Intensity   (w/cm2) Duration  (Mins) Add. Tx Parameters / Comment   []Combo        []Phonophoresis     Meds:   []Ultrasound         []Ultrasound and E-Stim          Comment:        Massage Duration  (Mins) Add. Tx Parameters / Comment   [x]Massage - IASTM     []Massage - Scar Tissue     []Massage - Self Administered     []Massage - Therapeutic     []Myofascial Release        Comment:      Other Modalities Duration  (Mins)  Add. Tx Parameters / Comment   []Active Release     []Cupping     []Dry Needling     []Intermittent Compression      []Laser     []Lightwave     []Traction      []Other:       Comment:      THERAPEUTIC EXERCISES:    Stretching Cardio Rehab Other   []Stretching - Active []Cardio - Bike []Rehab - Ankle/Foot []Agility []PNF   []Stretching - Dynamic []Cardio - Elliptical []Rehab - Knee []Balance [x]ROM - Active   []Stretching  - Passive []Cardio - Jog/Run []Rehab - Hip []Blood Flow Restriction [x]ROM - Passive   []Stretching - PNF []Cardio - Treadmill []Rehab - Wrist/Hand []Foam Roller []RTP - Concussion Protocol   []Stretching - Static []Cardio - Upper Body Ergometer []Rehab - Elbow []Functional Exercises []RTP - Sport Specific    []Cardio - Walk [x]Rehab - Shoulder []Joint Mobilization []Strengthening Exercises     []Rehab - Neck/Spine []Manual Therapy []Other:     []Rehab - Back []Plyometric Exercises      []Rehab - Other       Comment:            Warm-Up Reps/Sets/Time Weight #                         Exercise Reps/Sets/Time Weight #   Shoulder Shrugs 4x15    Shoulder pro/retraction 4x15    Elbow flexion/extension 4x15    PROM Shoulder IR/ER 2x 2min                                    Comment:  PROM exercises were within pain threshold of 0-3/10.     Miscellaneous Add. Tx Parameters / Comment   []Compression Wrap    []Support Wrap    []Taping - Preventative    []Taping - Injured Part    [x]Wound Care    []Other:      Comment:

## 2023-04-05 ENCOUNTER — OFFICE VISIT (OUTPATIENT)
Dept: SPORTS MEDICINE | Facility: CLINIC | Age: 18
End: 2023-04-05
Payer: COMMERCIAL

## 2023-04-05 VITALS
HEART RATE: 58 BPM | HEIGHT: 78 IN | WEIGHT: 233 LBS | BODY MASS INDEX: 26.96 KG/M2 | DIASTOLIC BLOOD PRESSURE: 66 MMHG | SYSTOLIC BLOOD PRESSURE: 139 MMHG

## 2023-04-05 DIAGNOSIS — Z98.890 S/P ARTHROSCOPY OF LEFT SHOULDER: Primary | ICD-10-CM

## 2023-04-05 PROCEDURE — 99024 POSTOP FOLLOW-UP VISIT: CPT | Mod: S$GLB,,, | Performed by: PHYSICIAN ASSISTANT

## 2023-04-05 PROCEDURE — 99024 PR POST-OP FOLLOW-UP VISIT: ICD-10-PCS | Mod: S$GLB,,, | Performed by: PHYSICIAN ASSISTANT

## 2023-04-05 PROCEDURE — 99999 PR PBB SHADOW E&M-EST. PATIENT-LVL III: ICD-10-PCS | Mod: PBBFAC,,, | Performed by: PHYSICIAN ASSISTANT

## 2023-04-05 PROCEDURE — 99999 PR PBB SHADOW E&M-EST. PATIENT-LVL III: CPT | Mod: PBBFAC,,, | Performed by: PHYSICIAN ASSISTANT

## 2023-04-05 NOTE — PROGRESS NOTES
"POST-OPERATIVE EXAMINATION    17 y.o. Male who returns for follow after surgery. He is 2 weeks s/p:    1. Shoulder arthroscopic anterior capsulorrhaphy (CPT 84968) (complex, -22 modifier)  2. Shoulder arthroscopic anterior labral repair (CPT 47455)   3. Shoulder arthroscopic posterior labral repair, capsulorrhaphy, 7:00 position (CPT 30716)  4. Shoulder arthroscopic extensive debridement (anterior, posterior glenohumeral joint) (CPT 85618)  5. Shoulder manipulation under anesthesia (CPT 69147)  6. Shoulder arthroscopic lysis of adhesions (CPT 89434):  7. Shoulder arthroscopic SLAP type 8 repair (CPT 91380)  8. Shoulder arthroscopic rotator cuff repair (rotator interval closure, supraspinatus to subscapularis, CPT 95367)    He is doing well without any issues. Wearing his sling as instructed.      PHYSICAL EXAMINATION:  /66   Pulse (!) 58   Ht 6' 8" (2.032 m)   Wt 105.7 kg (233 lb)   BMI 25.60 kg/m²   General: Well-developed well-nourished 17 y.o. malein no acute distress   Cardiovascular: Regular rhythm   Lungs: No labored breathing or wheezing appreciated   Neuro: Alert and oriented ×3   Psychiatric: well oriented to person, place and time, demonstrates normal mood and affect   Skin: No rashes, lesions or ulcers, normal temperature, turgor, and texture on involved extremity    ORTHOPEDIC EXAM:  Normal post-operative swelling  Normal post-operative scarring  Surgical incisions healing well with no outward signs of infection  Strength: Not tested  ROM: Not tested  Tests: None    ASSESSMENT:      ICD-10-CM ICD-9-CM   1. S/P arthroscopy of left shoulder  Z98.890 V45.89           PLAN:       Continue to wear sling at all times as instructed  Begin physical therapy next week as planned; arthroscopic Bankart repair and SLAP repair protocol  RTC with Tiffani Snow MD in 1 month as scheduled                "

## 2023-04-11 ENCOUNTER — CLINICAL SUPPORT (OUTPATIENT)
Dept: REHABILITATION | Facility: HOSPITAL | Age: 18
End: 2023-04-11
Attending: PHYSICIAN ASSISTANT
Payer: COMMERCIAL

## 2023-04-11 DIAGNOSIS — M25.512 ACUTE PAIN OF LEFT SHOULDER: ICD-10-CM

## 2023-04-11 DIAGNOSIS — M75.22 BICEPS TENDONITIS ON LEFT: ICD-10-CM

## 2023-04-11 DIAGNOSIS — M25.619 LIMITED RANGE OF MOTION (ROM) OF SHOULDER: ICD-10-CM

## 2023-04-11 DIAGNOSIS — M25.312 INSTABILITY OF LEFT SHOULDER JOINT: ICD-10-CM

## 2023-04-11 PROBLEM — M25.612 DECREASED RANGE OF MOTION OF LEFT SHOULDER: Status: RESOLVED | Noted: 2023-01-15 | Resolved: 2023-04-11

## 2023-04-11 PROBLEM — G25.89 SCAPULAR DYSKINESIS: Status: RESOLVED | Noted: 2023-01-15 | Resolved: 2023-04-11

## 2023-04-11 PROCEDURE — 97110 THERAPEUTIC EXERCISES: CPT | Performed by: PHYSICAL THERAPIST

## 2023-04-11 PROCEDURE — 97140 MANUAL THERAPY 1/> REGIONS: CPT | Performed by: PHYSICAL THERAPIST

## 2023-04-11 PROCEDURE — 97161 PT EVAL LOW COMPLEX 20 MIN: CPT | Performed by: PHYSICAL THERAPIST

## 2023-04-11 NOTE — PLAN OF CARE
[facility]  Physical Therapy Initial Evaluation    Date: 4/11/2023   Name: Dieudonne Martin  Clinic Number: [id number]    Therapy Diagnosis:   Encounter Diagnoses   Name Primary?    Instability of left shoulder joint     Biceps tendonitis on left     Acute pain of left shoulder     Limited range of motion (ROM) of shoulder      Physician: Milton Yap III, *    Physician Orders: PT Eval and Treat   Medical Diagnosis from Referral:   M25.312 (ICD-10-CM) - Instability of left shoulder joint   M75.22 (ICD-10-CM) - Biceps tendonitis on left   Evaluation Date: 4/11/2023  Authorization Period Expiration: 12/31/2023  Plan of Care Expiration: 10/11/2023  Visit # / Visits authorized: 1/ 20    Time In: 1400  Time Out: 1500  Total Appointment Time (timed & untimed codes): 60 minutes    Precautions: Standard      OPERATION:   left  1. Shoulder arthroscopic anterior capsulorrhaphy (CPT 04379) (complex, -22 modifier)  2. Shoulder arthroscopic anterior labral repair (CPT 58962)   3. Shoulder arthroscopic posterior labral repair, capsulorrhaphy, 7:00 position (CPT 47866)  4. Shoulder arthroscopic extensive debridement (anterior, posterior glenohumeral joint) (CPT 37803)  5. Shoulder manipulation under anesthesia (CPT 65211)  6. Shoulder arthroscopic lysis of adhesions (CPT 41523):  7. Shoulder arthroscopic SLAP type 8 repair (CPT 06081)  8. Shoulder arthroscopic rotator cuff repair (rotator interval closure, supraspinatus to subscapularis, CPT 03446)    Subjective   Date of onset: October  History of current condition - Dieudonne reports: Patient was playing basketball end of Oct/ early Nov when he set a screen and felt pain in the front of his L shoulder. He continued the season and underwent surgery on 3/21 to repair the torn labrum in his left shoulder. 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 Patient's goal is  to get back to basketball as soon as possible.      Medical History:   No past medical history on file.    Surgical History:   Dieudonne Martin  has a past surgical history that includes Shoulder arthroscopy w/ Bankhart procedure (Left, 3/21/2023); capsulorrhaphy (Left, 3/21/2023); Repair of superior labral anterior-to-posterior (SLAP) tear of shoulder (Left, 3/21/2023); and Arthroscopic debridement of shoulder (Left, 3/21/2023).    Medications:   Dieudonne has a current medication list which includes the following prescription(s): aspirin, meloxicam, oxycodone-acetaminophen, promethazine, and tramadol.    Allergies:   Review of patient's allergies indicates:  No Known Allergies     Imaging, MRI studies:     PLAN: We have discussed the surgery and recovery of arthroscopic shoulder surgery. he understands that there may be limited mobility up to several weeks after surgery depending on procedures that are performed at the time of surgery.     The spectrum of treatment options were discussed with the patient, including nonoperative and operative options.  After thorough discussion, the patient has elected to undergo surgical treatment to include:     left  a. Shoulder arthroscopic anterior labral repair  b. Shoulder arthroscopic anterior capsulorrhaphy  c. Shoulder arthroscopic posterior labral repair  d. Shoulder arthroscopic posterior capsulorrhaphy  e. Shoulder arthroscopic partial synovectomy/debridement  f.  Shoulder arthroscopic possible biceps tenodesis (vs. open subpect tenodesis)  g. Shoulder arthroscopic possible SLAP repair      Prior Therapy: Prior to surgery stabilization  Social History: He lives alone  Occupation: [employer] basketball  Prior Level of Function: Independent -  HIEU  Current Level of Function: Mod Ind in sling    Pain:  Current 6/10, worst 9/10, best 5/10   Location: { left shoulder(s)  Description: Aching and Tight  Aggravating Factors: Laying and Flexing  Easing Factors:  rest    Pts goals: return to basketball    Objective     Observation: Patient is a 17 y.o. male alert and oriented     Posture: Left shoulder protracted, pt in shoulder sling with abduction pillow. Adjusted sling with Extender used: Samia Sanchez MAT, LAT, ATC    Passive Range of Motion:   Shoulder Left   Flexion 40   Abduction nt   ER at 20 neutral   IR nt      Active Range of Motion:   Shoulder Right Left   Flexion 180 nt   Abduction 180 nt   ER at 0 60 nt   ER at 90 100 nt   IR (behind back) T12 nt     Upper Extremity Strength:  Formal MMT not performed 2/2 POD#21 and increased pain.      Joint Mobility: Left shoulder guarded as expected post-op. Full elbow extension and ER to neutral with arm by his side. Very guarded with mobs due to discomfort    Palpation:  Left shoulder tender as expected post-op.    Sensation: Intact but diminished 2/2 residual nerve block.         Limitation/Restriction for FOTO Shoulder Survey    Therapist reviewed FOTO scores for Dieudonne Martin on 4/11/2023.   FOTO documents entered into [employer] - see Media section.    Limitation Score: 78%         TREATMENT   Treatment Time In: 1510  Treatment Time Out: 1540  Total Treatment time (time-based codes) separate from Evaluation: 30 minutes    Dieudonne received therapeutic exercises to develop strength, endurance, ROM, and flexibility for 15 minutes including:  AAROM elbow flexion/extension 30  Table slide flexion/scaption 3'  Table step backs    Dieudonne received the following manual therapy techniques: Joint mobilizations and Soft tissue Mobilization were applied to the: L shoulder for 15 minutes, including:  Gr I-II relaxation  PROM elbow full motion  Gr II flexion 0-40  ER to neutral arm by side       Home Exercises and Patient Education Provided    Education provided:   - post op protocol provided  - sling 23 hours  - full elbow extension    Written Home Exercises Provided: yes.  Exercises were reviewed and patient was able to  demonstrate them prior to the end of the session.  Patient demonstrated good  understanding of the education provided.     See EMR under Patient Instructions for exercisesprovided 4/11/2023.    Assessment   Dieudonne is a 17 y.o. male referred to outpatient Physical Therapy with a medical diagnosis of left shoulder Bankart and SLAP repair. Pt presents with limited shoulder ROM, strength deficits, pain with ADL and sleeping, needs help around the house, and unable to play basketball    Pt prognosis is Excellent.   Pt will benefit from skilled outpatient Physical Therapy to address the deficits stated above and in the chart below, provide pt/family education, and to maximize pt's level of independence.     Plan of care discussed with patient: Yes  Pt's spiritual, cultural and educational needs considered and patient is agreeable to the plan of care and goals as stated below:     Anticipated Barriers for therapy: transportation    Medical Necessity is demonstrated by the following  History  Co-morbidities and personal factors that may impact the plan of care Co-morbidities:   level of undertstanding of current condition    Personal Factors:   no deficits     low   Examination  Body Structures and Functions, activity limitations and participation restrictions that may impact the plan of care Body Regions:   upper extremities    Body Systems:    ROM  strength  transfers  motor control  motor learning  edema  scar formation    Participation Restrictions:   covid-19    Activity limitations:   Learning and applying knowledge  no deficits    General Tasks and Commands  no deficits    Communication  no deficits    Mobility  lifting and carrying objects  fine hand use (grasping/picking up)    Self care  no deficits    Domestic Life  no deficits    Interactions/Relationships  no deficits    Life Areas  no deficits    Community and Social Life  no deficits         low   Clinical Presentation stable and uncomplicated low   Decision  Making/ Complexity Score: low     GOALS: Short Term Goals:  3 weeks  1.Report decreased shoulder pain < / =  3/10  to increase tolerance for return to ADL out the sling  2. Increase PROM return to 90 deg flexion and 10 deg ER   3. Increased strength by 1/3 MMT grade in  L scap to increase tolerance for ADL and work activities.  4. Pt to tolerate HEP to improve ROM and independence with ADL's    Long Term Goals: 24 weeks  1.Report decreased shoulder pain  < / =  1 /10  to increase tolerance for return to basketball  2.Increase AROM to full motion in shoulder pain free  3.Increase strength to >/= 4/5 in L shoulder stabilizers to increase tolerance for ADL and work activities.  4. Pt goal: return to basketball without L shoulder pain   5. Pt will have improved gcode of CJ (20-40% limited) on FOTO shoulder in order to demonstrate true functional improvement.     Plan   Plan of care Certification: 4/11/2023 to 10/11/2023.    Outpatient Physical Therapy 2 times weekly for 24 weeks to include the following interventions: Manual Therapy, Moist Heat/ Ice, Neuromuscular Re-ed, Patient Education, Self Care, Therapeutic Activities, and Therapeutic Exercise.     Devendra Plummer, PT

## 2023-04-14 ENCOUNTER — ATHLETIC TRAINING SESSION (OUTPATIENT)
Dept: SPORTS MEDICINE | Facility: CLINIC | Age: 18
End: 2023-04-14
Payer: COMMERCIAL

## 2023-04-14 NOTE — PROGRESS NOTES
Dieudonne completed:    [x]  INJURY TREATMENT   []  MAINTENANCE  DATE OF SERVICE: 04/14/2023  INJURY/CONDITON: Left Shoulder Post-op    Dieudonne received the selected modalities after being cleared for contradictions.  Dieudonne received education on potenital side effects of the selected modalities and agreed to treatment.      MODALITIES:    Cryotherapy / Thermotherapy Duration  (Mins) Add. Tx Parameters / Comment   []Cold Tub / Whirlpool (50-60 F)     []Contrast Bath (105-110 F & 50-65 F)     []Game Ready     []Hot Pack     []Hot Tub / Whirlpool ( F)     []Ice Massage     []Ice Pack     []Paraffin Wax (126-130 F)     []Vapocoolant Spray        Comment:       Electrotherapy Waveform   (AC/DC) Modulation (Cont./Interrupted/Surged) Intensity   (V) Pulse Width/Dur.  (uS) Pulse Rate/Freq.  (Hz, PPS or CPS) Duration  (Mins) Add. Tx Parameters / Comment   []Combo          [x]E-Stim - IFC   22   20    []E-Stim - Premod          []E-Stim - Taiwanese          []E-Stim - TENS          []E-Stim - Other          []Iontophoresis        Meds:     Comment:      Ultrasound Duty Cycle   (%) Freq.  (Mhz) Intensity   (w/cm2) Duration  (Mins) Add. Tx Parameters / Comment   []Combo        []Phonophoresis     Meds:   []Ultrasound         []Ultrasound and E-Stim          Comment:        Massage Duration  (Mins) Add. Tx Parameters / Comment   [x]Massage - IASTM 5 traps   []Massage - Scar Tissue     []Massage - Self Administered     []Massage - Therapeutic     []Myofascial Release        Comment:      Other Modalities Duration  (Mins)  Add. Tx Parameters / Comment   []Active Release     []Cupping     []Dry Needling     []Intermittent Compression      []Laser     []Lightwave     []Traction      []Other:       Comment:      THERAPEUTIC EXERCISES:    Stretching Cardio Rehab Other   []Stretching - Active [x]Cardio - Bike []Rehab - Ankle/Foot []Agility []PNF   []Stretching - Dynamic []Cardio - Elliptical []Rehab - Knee []Balance [x]ROM - Active    []Stretching - Passive []Cardio - Jog/Run []Rehab - Hip []Blood Flow Restriction [x]ROM - Passive   []Stretching - PNF []Cardio - Treadmill []Rehab - Wrist/Hand []Foam Roller []RTP - Concussion Protocol   []Stretching - Static []Cardio - Upper Body Ergometer []Rehab - Elbow []Functional Exercises []RTP - Sport Specific    []Cardio - Walk [x]Rehab - Shoulder []Joint Mobilization []Strengthening Exercises     []Rehab - Neck/Spine []Manual Therapy []Other:     []Rehab - Back []Plyometric Exercises      []Rehab - Other       Comment:            Warm-Up Reps/Sets/Time Weight #                         Exercise Reps/Sets/Time Weight #   ROM Elbow Flex/Ext 2x15    Shrugs 3x10    Scapular Retractions 3x10    Table Slide-Flexion 1x20    Table Slide-Scaption 1x20                               Comment:      Miscellaneous Add. Tx Parameters / Comment   []Compression Wrap    []Support Wrap    []Taping - Preventative    []Taping - Injured Part    []Wound Care    []Other:      Comment:

## 2023-04-17 ENCOUNTER — ATHLETIC TRAINING SESSION (OUTPATIENT)
Dept: SPORTS MEDICINE | Facility: CLINIC | Age: 18
End: 2023-04-17
Payer: COMMERCIAL

## 2023-04-17 NOTE — PROGRESS NOTES
Dieudonne completed:    [x]  INJURY TREATMENT   []  MAINTENANCE  DATE OF SERVICE: 04/17/2023  INJURY/CONDITON: Left shoulder post-op    Dieudonne received the selected modalities after being cleared for contradictions.  Dieudonne received education on potenital side effects of the selected modalities and agreed to treatment.      MODALITIES:    Cryotherapy / Thermotherapy Duration  (Mins) Add. Tx Parameters / Comment   []Cold Tub / Whirlpool (50-60 F)     []Contrast Bath (105-110 F & 50-65 F)     []Game Ready     []Hot Pack     []Hot Tub / Whirlpool ( F)     []Ice Massage     []Ice Pack     []Paraffin Wax (126-130 F)     []Vapocoolant Spray        Comment:       Electrotherapy Waveform   (AC/DC) Modulation (Cont./Interrupted/Surged) Intensity   (V) Pulse Width/Dur.  (uS) Pulse Rate/Freq.  (Hz, PPS or CPS) Duration  (Mins) Add. Tx Parameters / Comment   []Combo          []E-Stim - IFC          []E-Stim - Premod          []E-Stim - Qatari          []E-Stim - TENS          []E-Stim - Other          []Iontophoresis        Meds:     Comment:      Ultrasound Duty Cycle   (%) Freq.  (Mhz) Intensity   (w/cm2) Duration  (Mins) Add. Tx Parameters / Comment   []Combo        []Phonophoresis     Meds:   []Ultrasound         []Ultrasound and E-Stim          Comment:        Massage Duration  (Mins) Add. Tx Parameters / Comment   []Massage - IASTM     []Massage - Scar Tissue     []Massage - Self Administered     []Massage - Therapeutic     []Myofascial Release        Comment:      Other Modalities Duration  (Mins)  Add. Tx Parameters / Comment   []Active Release     []Cupping     []Dry Needling     []Intermittent Compression      []Laser     []Lightwave     []Traction      []Other:       Comment:      THERAPEUTIC EXERCISES:    Stretching Cardio Rehab Other   []Stretching - Active []Cardio - Bike []Rehab - Ankle/Foot []Agility []PNF   []Stretching - Dynamic []Cardio - Elliptical []Rehab - Knee []Balance [x]ROM - Active    []Stretching - Passive []Cardio - Jog/Run []Rehab - Hip []Blood Flow Restriction [x]ROM - Passive   []Stretching - PNF []Cardio - Treadmill []Rehab - Wrist/Hand []Foam Roller []RTP - Concussion Protocol   []Stretching - Static []Cardio - Upper Body Ergometer []Rehab - Elbow []Functional Exercises []RTP - Sport Specific    []Cardio - Walk [x]Rehab - Shoulder []Joint Mobilization []Strengthening Exercises     []Rehab - Neck/Spine []Manual Therapy []Other:     []Rehab - Back []Plyometric Exercises      []Rehab - Other       Comment:            Warm-Up Reps/Sets/Time Weight #                         Exercise Reps/Sets/Time Weight #   Shrugs 3x10    Scapular retractions 3x10    Elbow flexion and Extension 2x15    Table Slides:Frontal flexion 1x20    Table Slides: Scaption 1x20                                Comment:      Miscellaneous Add. Tx Parameters / Comment   []Compression Wrap    []Support Wrap    []Taping - Preventative    []Taping - Injured Part    []Wound Care    [x]Other:Shoulder brace/sling      Comment:

## 2023-04-18 ENCOUNTER — CLINICAL SUPPORT (OUTPATIENT)
Dept: REHABILITATION | Facility: HOSPITAL | Age: 18
End: 2023-04-18
Attending: PHYSICIAN ASSISTANT
Payer: COMMERCIAL

## 2023-04-18 DIAGNOSIS — M25.619 LIMITED RANGE OF MOTION (ROM) OF SHOULDER: ICD-10-CM

## 2023-04-18 DIAGNOSIS — M25.512 ACUTE PAIN OF LEFT SHOULDER: Primary | ICD-10-CM

## 2023-04-18 PROCEDURE — 97140 MANUAL THERAPY 1/> REGIONS: CPT | Performed by: PHYSICAL THERAPIST

## 2023-04-18 PROCEDURE — 97110 THERAPEUTIC EXERCISES: CPT | Performed by: PHYSICAL THERAPIST

## 2023-04-18 NOTE — PROGRESS NOTES
Physical Therapy Daily Treatment Note     Name: Dieudonne Martin  [facility] Number: [id number]    Therapy Diagnosis:   Encounter Diagnoses   Name Primary?    Acute pain of left shoulder Yes    Limited range of motion (ROM) of shoulder      Physician: Milton Yap III, *    Visit Date: 4/18/2023  Physician Orders: PT Eval and Treat   Medical Diagnosis from Referral:   M25.312 (ICD-10-CM) - Instability of left shoulder joint   M75.22 (ICD-10-CM) - Biceps tendonitis on left   Evaluation Date: 4/11/2023  Authorization Period Expiration: 12/31/2023  Plan of Care Expiration: 10/11/2023  Visit # / Visits authorized: 1/ 20    Time In: 1400  Time Out: 1500  Total Billable Time: 60 minutes    Precautions: Standard      OPERATION:   left  1. Shoulder arthroscopic anterior capsulorrhaphy (CPT 04849) (complex, -22 modifier)  2. Shoulder arthroscopic anterior labral repair (CPT 38991)   3. Shoulder arthroscopic posterior labral repair, capsulorrhaphy, 7:00 position (CPT 91237)  4. Shoulder arthroscopic extensive debridement (anterior, posterior glenohumeral joint) (CPT 56564)  5. Shoulder manipulation under anesthesia (CPT 31541)  6. Shoulder arthroscopic lysis of adhesions (CPT 95967):  7. Shoulder arthroscopic SLAP type 8 repair (CPT 15879)  8. Shoulder arthroscopic rotator cuff repair (rotator interval closure, supraspinatus to subscapularis, CPT 61918)    POST OPERATIVE PLAN: We will follow the arthroscopic Bankart repair and SLAP repair guidelines. We discussed with the patient's family after surgery. The patient will remain in a sling for 6 weeks. We will start PT at the 3 week wandy.     Quality of tissue: Good    Quality of the repair: Good    Subjective     Pt reports: My shoulder was sore but I've been doing my exercises. Notes being nervous to mob it again today.  He was compliant with home exercise program.  Response to previous treatment: sore  Functional change: in sling with abd pillow    Pain: 4/10  Location:  left shoulder      Objective     Dieudonne received therapeutic exercises to develop strength, endurance, ROM, flexibility, and posture for 30 minutes including:  Table slides flexion/scaption 5' each  Wrist 4 way 3#  Table step backs 30x  Scap squeeze 30x    Dieudonne received the following manual therapy techniques: Joint mobilizations and Soft tissue Mobilization were applied to the: L shoulder for 20 minutes, including:  Gr I-II oscillations  Gr III flexion 0-90 degrees  Elbow extension to 0 deg  ER arm by side 0-10 deg      Dieudonne participated in neuromuscular re-education activities to improve: Coordination, Kinesthetic, Sense, Proprioception, and Posture for 0 minutes. The following activities were included:      Dieudonne participated in dynamic functional therapeutic activities to improve functional performance for 0  minutes, including:      Dieudonne received cold pack for 10 minutes to to decrease circulation, pain, and swelling.      Home Exercises Provided and Patient Education Provided     Education provided:   - post op protocol provided  - sling 23 hours  - full elbow extension    Written Home Exercises Provided: Patient instructed to cont prior HEP.  Exercises were reviewed and Dieudonne was able to demonstrate them prior to the end of the session.  Dieudonne demonstrated good  understanding of the education provided.     See EMR under Patient Instructions for exercises provided prior visit.    Assessment     Dieudonne was guarded at the start of mobs but he improved by end of session, relaxed shoulder and passive motion to 90 degrees. ER well tolerated, just limited by his precautions. Better table slide and step back tolerance and motion  Dieudonne Is progressing well towards his goals.   Pt prognosis is Excellent.     Pt will continue to benefit from skilled outpatient physical therapy to address the deficits listed in the problem list box on initial evaluation, provide pt/family education and to maximize pt's  level of independence in the home and community environment.     Pt's spiritual, cultural and educational needs considered and pt agreeable to plan of care and goals.    Anticipated barriers to physical therapy: travel    GOALS: Short Term Goals:  3 weeks(Progressing, not met)  1.Report decreased shoulder pain < / =  3/10  to increase tolerance for return to ADL out the sling  2. Increase PROM return to 90 deg flexion and 10 deg ER   3. Increased strength by 1/3 MMT grade in  L scap to increase tolerance for ADL and work activities.  4. Pt to tolerate HEP to improve ROM and independence with ADL's     Long Term Goals: 24 weeks(Progressing, not met)  1.Report decreased shoulder pain  < / =  1 /10  to increase tolerance for return to basketball  2.Increase AROM to full motion in shoulder pain free  3.Increase strength to >/= 4/5 in L shoulder stabilizers to increase tolerance for ADL and work activities.  4. Pt goal: return to basketball without L shoulder pain   5. Pt will have improved gcode of CJ (20-40% limited) on FOTO shoulder in order to demonstrate true functional improvement.     Plan     Plan of care Certification: 4/11/2023 to 10/11/2023.    Continue to progress through SLAP/Bankart protocol    Devendra Plummer, PT, DPT, OCS, FAAOMPT

## 2023-04-20 ENCOUNTER — TELEPHONE (OUTPATIENT)
Dept: SPORTS MEDICINE | Facility: CLINIC | Age: 18
End: 2023-04-20
Payer: COMMERCIAL

## 2023-04-20 ENCOUNTER — ATHLETIC TRAINING SESSION (OUTPATIENT)
Dept: SPORTS MEDICINE | Facility: CLINIC | Age: 18
End: 2023-04-20
Payer: COMMERCIAL

## 2023-04-20 NOTE — PROGRESS NOTES
Dieudonne completed:    [x]  INJURY TREATMENT   []  MAINTENANCE  DATE OF SERVICE: 04/20/  INJURY/CONDITON: Left shoulder    Dieudonne received the selected modalities after being cleared for contradictions.  Dieudonne received education on potenital side effects of the selected modalities and agreed to treatment.      MODALITIES:    Cryotherapy / Thermotherapy Duration  (Mins) Add. Tx Parameters / Comment   []Cold Tub / Whirlpool (50-60 F)     []Contrast Bath (105-110 F & 50-65 F)     []Game Ready     []Hot Pack     []Hot Tub / Whirlpool ( F)     []Ice Massage     []Ice Pack     []Paraffin Wax (126-130 F)     []Vapocoolant Spray        Comment:       Electrotherapy Waveform   (AC/DC) Modulation (Cont./Interrupted/Surged) Intensity   (V) Pulse Width/Dur.  (uS) Pulse Rate/Freq.  (Hz, PPS or CPS) Duration  (Mins) Add. Tx Parameters / Comment   []Combo          [x]E-Stim - IFC   23   15 min    []E-Stim - Premod          []E-Stim - German          []E-Stim - TENS          []E-Stim - Other          []Iontophoresis        Meds:     Comment:      Ultrasound Duty Cycle   (%) Freq.  (Mhz) Intensity   (w/cm2) Duration  (Mins) Add. Tx Parameters / Comment   []Combo        []Phonophoresis     Meds:   []Ultrasound         []Ultrasound and E-Stim          Comment:        Massage Duration  (Mins) Add. Tx Parameters / Comment   []Massage - IASTM     []Massage - Scar Tissue     []Massage - Self Administered     []Massage - Therapeutic     []Myofascial Release        Comment:      Other Modalities Duration  (Mins)  Add. Tx Parameters / Comment   []Active Release     []Cupping     []Dry Needling     []Intermittent Compression      []Laser     []Lightwave     []Traction      []Other:       Comment:      THERAPEUTIC EXERCISES:    Stretching Cardio Rehab Other   []Stretching - Active []Cardio - Bike []Rehab - Ankle/Foot []Agility []PNF   []Stretching - Dynamic []Cardio - Elliptical []Rehab - Knee []Balance [x]ROM - Active   []Stretching  - Passive []Cardio - Jog/Run []Rehab - Hip []Blood Flow Restriction [x]ROM - Passive   []Stretching - PNF []Cardio - Treadmill []Rehab - Wrist/Hand []Foam Roller []RTP - Concussion Protocol   []Stretching - Static []Cardio - Upper Body Ergometer []Rehab - Elbow []Functional Exercises []RTP - Sport Specific    []Cardio - Walk [x]Rehab - Shoulder []Joint Mobilization []Strengthening Exercises     []Rehab - Neck/Spine []Manual Therapy []Other:     []Rehab - Back []Plyometric Exercises      []Rehab - Other       Comment:            Warm-Up Reps/Sets/Time Weight #                         Exercise Reps/Sets/Time Weight #   Elbow Flex/Ext 2x20    Table slide-Flexion 1x20    Table slide- Scaption 1x20    PROM shoulder Frontal flexion, hip hinge 1x20    Wrist flex/ext 2x15 3lb   Wrist proprioception  2min Gyro ball                         Comment:      Miscellaneous Add. Tx Parameters / Comment   []Compression Wrap    []Support Wrap    []Taping - Preventative    []Taping - Injured Part    []Wound Care    []Other:      Comment:

## 2023-04-24 ENCOUNTER — PATIENT MESSAGE (OUTPATIENT)
Dept: SPORTS MEDICINE | Facility: CLINIC | Age: 18
End: 2023-04-24
Payer: COMMERCIAL

## 2023-04-25 ENCOUNTER — CLINICAL SUPPORT (OUTPATIENT)
Dept: REHABILITATION | Facility: HOSPITAL | Age: 18
End: 2023-04-25
Attending: PHYSICIAN ASSISTANT
Payer: COMMERCIAL

## 2023-04-25 DIAGNOSIS — M25.619 LIMITED RANGE OF MOTION (ROM) OF SHOULDER: ICD-10-CM

## 2023-04-25 DIAGNOSIS — M25.512 ACUTE PAIN OF LEFT SHOULDER: Primary | ICD-10-CM

## 2023-04-25 PROCEDURE — 97140 MANUAL THERAPY 1/> REGIONS: CPT | Performed by: PHYSICAL THERAPIST

## 2023-04-25 PROCEDURE — 97110 THERAPEUTIC EXERCISES: CPT | Performed by: PHYSICAL THERAPIST

## 2023-04-25 PROCEDURE — 97112 NEUROMUSCULAR REEDUCATION: CPT | Performed by: PHYSICAL THERAPIST

## 2023-04-26 NOTE — PROGRESS NOTES
Physical Therapy Daily Treatment Note     Name: Dieudonne Martin  [facility] Number: [id number]    Therapy Diagnosis:   Encounter Diagnoses   Name Primary?    Acute pain of left shoulder Yes    Limited range of motion (ROM) of shoulder      Physician: Milton Yap III, *    Visit Date: 4/25/2023  Physician Orders: PT Eval and Treat   Medical Diagnosis from Referral:   M25.312 (ICD-10-CM) - Instability of left shoulder joint   M75.22 (ICD-10-CM) - Biceps tendonitis on left   Evaluation Date: 4/11/2023  Authorization Period Expiration: 12/31/2023  Plan of Care Expiration: 10/11/2023  Visit # / Visits authorized: 5/ 20    Time In: 1400  Time Out: 1500  Total Billable Time: 60 minutes    Precautions: Standard      OPERATION:   left  1. Shoulder arthroscopic anterior capsulorrhaphy (CPT 08884) (complex, -22 modifier)  2. Shoulder arthroscopic anterior labral repair (CPT 62549)   3. Shoulder arthroscopic posterior labral repair, capsulorrhaphy, 7:00 position (CPT 34113)  4. Shoulder arthroscopic extensive debridement (anterior, posterior glenohumeral joint) (CPT 90070)  5. Shoulder manipulation under anesthesia (CPT 79979)  6. Shoulder arthroscopic lysis of adhesions (CPT 77316):  7. Shoulder arthroscopic SLAP type 8 repair (CPT 00489)  8. Shoulder arthroscopic rotator cuff repair (rotator interval closure, supraspinatus to subscapularis, CPT 51660)    POST OPERATIVE PLAN: We will follow the arthroscopic Bankart repair and SLAP repair guidelines. We discussed with the patient's family after surgery. The patient will remain in a sling for 6 weeks. We will start PT at the 3 week wandy.     Quality of tissue: Good    Quality of the repair: Good    Subjective     Pt reports: I've been doing my stuff and it's feeling a little better this week. Still scared of mobilizations  He was compliant with home exercise program.  Response to previous treatment: sore  Functional change: in sling with abd pillow    Pain: 4/10  Location:  left shoulder      Objective     Dieudonne received therapeutic exercises to develop strength, endurance, ROM, flexibility, and posture for 30 minutes including:  Table slides flexion/scaption 5' each  Wrist 4 way 2# 20x ea  Scap squeeze 30x  Pendulums 4' all dir for pain relief  AAROM elbow flexion/extension 30x      Dieudonne received the following manual therapy techniques: Joint mobilizations and Soft tissue Mobilization were applied to the: L shoulder for 20 minutes, including:  Gr I-II oscillations  Gr III flexion 0-90 degrees  Elbow extension to 0 deg  ER arm by side 0-10 deg      Dieudonne participated in neuromuscular re-education activities to improve: Coordination, Kinesthetic, Sense, Proprioception, and Posture for 10 minutes. The following activities were included:    Swiss ball roll outs 30x  Table step backs 30x    Dieudonne participated in dynamic functional therapeutic activities to improve functional performance for 0  minutes, including:      Dieudonne received cold pack for 10 minutes to to decrease circulation, pain, and swelling.      Home Exercises Provided and Patient Education Provided     Education provided:   - post op protocol provided  - sling 23 hours  - full elbow extension    Written Home Exercises Provided: Patient instructed to cont prior HEP.  Exercises were reviewed and Dieudonne was able to demonstrate them prior to the end of the session.  Dieudonne demonstrated good  understanding of the education provided.     See EMR under Patient Instructions for exercises provided prior visit.    Assessment     Dieudonne presented guarded again, improved when he called his mom and was distracted. Passive to 90 degrees, ER to 10-15 deg but not pushed per protocol. Continues to do best with table slides. Elbow full extension. Will progress out the sling next week  Dieudonne Is progressing well towards his goals.   Pt prognosis is Excellent.     Pt will continue to benefit from skilled outpatient physical  therapy to address the deficits listed in the problem list box on initial evaluation, provide pt/family education and to maximize pt's level of independence in the home and community environment.     Pt's spiritual, cultural and educational needs considered and pt agreeable to plan of care and goals.    Anticipated barriers to physical therapy: travel    GOALS: Short Term Goals:  3 weeks(Progressing, not met)  1.Report decreased shoulder pain < / =  3/10  to increase tolerance for return to ADL out the sling  2. Increase PROM return to 90 deg flexion and 10 deg ER   3. Increased strength by 1/3 MMT grade in  L scap to increase tolerance for ADL and work activities.  4. Pt to tolerate HEP to improve ROM and independence with ADL's     Long Term Goals: 24 weeks(Progressing, not met)  1.Report decreased shoulder pain  < / =  1 /10  to increase tolerance for return to basketball  2.Increase AROM to full motion in shoulder pain free  3.Increase strength to >/= 4/5 in L shoulder stabilizers to increase tolerance for ADL and work activities.  4. Pt goal: return to basketball without L shoulder pain   5. Pt will have improved gcode of CJ (20-40% limited) on FOTO shoulder in order to demonstrate true functional improvement.     Plan     Plan of care Certification: 4/11/2023 to 10/11/2023.    Continue to progress through SLAP/Bankart protocol    Devendra Plummer, PT, DPT, OCS, FAAOMPT

## 2023-04-27 ENCOUNTER — ATHLETIC TRAINING SESSION (OUTPATIENT)
Dept: SPORTS MEDICINE | Facility: CLINIC | Age: 18
End: 2023-04-27
Payer: COMMERCIAL

## 2023-04-27 NOTE — PROGRESS NOTES
Dieudonne completed:    [x]  INJURY TREATMENT   []  MAINTENANCE  DATE OF SERVICE: 04/27/2023  INJURY/CONDITON: Left shoulder 5-week post-op    Dieudonne received the selected modalities after being cleared for contradictions.  Dieudonne received education on potenital side effects of the selected modalities and agreed to treatment.      MODALITIES:    Cryotherapy / Thermotherapy Duration  (Mins) Add. Tx Parameters / Comment   []Cold Tub / Whirlpool (50-60 F)     []Contrast Bath (105-110 F & 50-65 F)     []Game Ready     []Hot Pack     []Hot Tub / Whirlpool ( F)     []Ice Massage     []Ice Pack     []Paraffin Wax (126-130 F)     []Vapocoolant Spray        Comment:       Electrotherapy Waveform   (AC/DC) Modulation (Cont./Interrupted/Surged) Intensity   (V) Pulse Width/Dur.  (uS) Pulse Rate/Freq.  (Hz, PPS or CPS) Duration  (Mins) Add. Tx Parameters / Comment   []Combo          []E-Stim - IFC          []E-Stim - Premod          []E-Stim - [place]          []E-Stim - TENS          []E-Stim - Other          []Iontophoresis        Meds:     Comment:      Ultrasound Duty Cycle   (%) Freq.  (Mhz) Intensity   (w/cm2) Duration  (Mins) Add. Tx Parameters / Comment   []Combo        []Phonophoresis     Meds:   []Ultrasound         []Ultrasound and E-Stim          Comment:        Massage Duration  (Mins) Add. Tx Parameters / Comment   []Massage - IASTM     []Massage - Scar Tissue     []Massage - Self Administered     []Massage - Therapeutic     []Myofascial Release        Comment:      Other Modalities Duration  (Mins)  Add. Tx Parameters / Comment   []Active Release     []Cupping     []Dry Needling     []Intermittent Compression      []Laser     []Lightwave     []Traction      []Other:       Comment:      THERAPEUTIC EXERCISES:    Stretching Cardio Rehab Other   []Stretching - Active []Cardio - Bike []Rehab - Ankle/Foot []Agility []PNF   []Stretching - Dynamic []Cardio - Elliptical []Rehab - Knee []Balance [x]ROM - Active    []Stretching - Passive []Cardio - Jog/Run []Rehab - Hip []Blood Flow Restriction [x]ROM - Passive   []Stretching - PNF []Cardio - Treadmill []Rehab - Wrist/Hand []Foam Roller []RTP - Concussion Protocol   []Stretching - Static []Cardio - Upper Body Ergometer []Rehab - Elbow []Functional Exercises []RTP - Sport Specific    []Cardio - Walk [x]Rehab - Shoulder []Joint Mobilization []Strengthening Exercises     []Rehab - Neck/Spine []Manual Therapy []Other:     []Rehab - Back []Plyometric Exercises      []Rehab - Other       Comment:            Warm-Up Reps/Sets/Time Weight #                         Exercise Reps/Sets/Time Weight #   Pendulums 2x1min    Table slides: flexion 1x20    Table slide: scaption 1x20    PROM standing frontal flexion 1x20                                    Comment:      Miscellaneous Add. Tx Parameters / Comment   []Compression Wrap    []Support Wrap    []Taping - Preventative    []Taping - Injured Part    []Wound Care    [x]Other: Shoulder sling     Comment:

## 2023-05-01 ENCOUNTER — OFFICE VISIT (OUTPATIENT)
Dept: SPORTS MEDICINE | Facility: CLINIC | Age: 18
End: 2023-05-01
Payer: COMMERCIAL

## 2023-05-01 VITALS
HEIGHT: 78 IN | SYSTOLIC BLOOD PRESSURE: 126 MMHG | DIASTOLIC BLOOD PRESSURE: 57 MMHG | WEIGHT: 234 LBS | HEART RATE: 67 BPM | BODY MASS INDEX: 27.07 KG/M2

## 2023-05-01 DIAGNOSIS — M25.312 SHOULDER INSTABILITY, LEFT: ICD-10-CM

## 2023-05-01 DIAGNOSIS — Z98.890 S/P ARTHROSCOPY OF LEFT SHOULDER: Primary | ICD-10-CM

## 2023-05-01 DIAGNOSIS — M25.312 INSTABILITY OF LEFT SHOULDER JOINT: ICD-10-CM

## 2023-05-01 PROCEDURE — 99024 PR POST-OP FOLLOW-UP VISIT: ICD-10-PCS | Mod: S$GLB,,, | Performed by: ORTHOPAEDIC SURGERY

## 2023-05-01 PROCEDURE — 99999 PR PBB SHADOW E&M-EST. PATIENT-LVL III: CPT | Mod: PBBFAC,,, | Performed by: ORTHOPAEDIC SURGERY

## 2023-05-01 PROCEDURE — 99024 POSTOP FOLLOW-UP VISIT: CPT | Mod: S$GLB,,, | Performed by: ORTHOPAEDIC SURGERY

## 2023-05-01 PROCEDURE — 99999 PR PBB SHADOW E&M-EST. PATIENT-LVL III: ICD-10-PCS | Mod: PBBFAC,,, | Performed by: ORTHOPAEDIC SURGERY

## 2023-05-01 NOTE — PROGRESS NOTES
"POST-OPERATIVE EXAMINATION    17 y.o. Male who returns for follow after surgery. He is 6 weeks s/p:    1. Shoulder arthroscopic anterior capsulorrhaphy (CPT 84182) (complex, -22 modifier)  2. Shoulder arthroscopic anterior labral repair (CPT 21899)   3. Shoulder arthroscopic posterior labral repair, capsulorrhaphy, 7:00 position (CPT 89415)  4. Shoulder arthroscopic extensive debridement (anterior, posterior glenohumeral joint) (CPT 97669)  5. Shoulder manipulation under anesthesia (CPT 33678)  6. Shoulder arthroscopic lysis of adhesions (CPT 15120):  7. Shoulder arthroscopic SLAP type 8 repair (CPT 08307)  8. Shoulder arthroscopic rotator cuff repair (rotator interval closure, supraspinatus to subscapularis, CPT 29415)    He is doing well without any issues. Wearing his sling as instructed. He began therapy a few weeks ago. He has been following his post op restrictions.      PHYSICAL EXAMINATION:  BP (!) 126/57   Pulse 67   Ht 6' 8" (2.032 m)   Wt 106.1 kg (234 lb)   BMI 25.71 kg/m²   General: Well-developed well-nourished 17 y.o. malein no acute distress   Cardiovascular: Regular rhythm   Lungs: No labored breathing or wheezing appreciated   Neuro: Alert and oriented ×3   Psychiatric: well oriented to person, place and time, demonstrates normal mood and affect   Skin: No rashes, lesions or ulcers, normal temperature, turgor, and texture on involved extremity    ORTHOPEDIC EXAM:  Normal post-operative swelling  Normal post-operative scarring  Surgical incisions healing well with no outward signs of infection. No tenderness about the shoulder today  Strength: Not tested  ROM: ER 0: neutral, FF-80  Tests: None    ASSESSMENT:      ICD-10-CM ICD-9-CM   1. S/P arthroscopy of left shoulder  Z98.890 V45.89   2. Instability of left shoulder joint  M25.312 718.81   3. Shoulder instability, left  M25.312 718.81             PLAN:       Can come out of sling, activity restrictions emphasized  Continue PT per protocol; " arthroscopic Bankart repair and SLAP repair protocol  RTC with Tiffani Snow MD in 6 weeks as scheduled

## 2023-05-02 ENCOUNTER — TELEPHONE (OUTPATIENT)
Dept: SPORTS MEDICINE | Facility: CLINIC | Age: 18
End: 2023-05-02
Payer: COMMERCIAL

## 2023-05-02 ENCOUNTER — CLINICAL SUPPORT (OUTPATIENT)
Dept: REHABILITATION | Facility: HOSPITAL | Age: 18
End: 2023-05-02
Attending: PHYSICIAN ASSISTANT
Payer: COMMERCIAL

## 2023-05-02 DIAGNOSIS — M25.512 ACUTE PAIN OF LEFT SHOULDER: Primary | ICD-10-CM

## 2023-05-02 DIAGNOSIS — M25.619 LIMITED RANGE OF MOTION (ROM) OF SHOULDER: ICD-10-CM

## 2023-05-02 PROCEDURE — 97140 MANUAL THERAPY 1/> REGIONS: CPT | Performed by: PHYSICAL THERAPIST

## 2023-05-02 PROCEDURE — 97110 THERAPEUTIC EXERCISES: CPT | Performed by: PHYSICAL THERAPIST

## 2023-05-02 PROCEDURE — 97112 NEUROMUSCULAR REEDUCATION: CPT | Performed by: PHYSICAL THERAPIST

## 2023-05-02 NOTE — PROGRESS NOTES
Physical Therapy Daily Treatment Note     Name: Dieudonne [facility] Number: [id number]    Therapy Diagnosis:   Encounter Diagnoses   Name Primary?    Acute pain of left shoulder Yes    Limited range of motion (ROM) of shoulder      Physician: Milton Yap III, *    Visit Date: 5/2/2023  Physician Orders: PT Eval and Treat   Medical Diagnosis from Referral:   M25.312 (ICD-10-CM) - Instability of left shoulder joint   M75.22 (ICD-10-CM) - Biceps tendonitis on left   Evaluation Date: 4/11/2023  Authorization Period Expiration: 12/31/2023  Plan of Care Expiration: 10/11/2023  Visit # / Visits authorized: 6/ 20    Time In: 1400  Time Out: 1500  Total Billable Time: 60 minutes    Precautions: Standard      OPERATION:   left  1. Shoulder arthroscopic anterior capsulorrhaphy (CPT 03396) (complex, -22 modifier)  2. Shoulder arthroscopic anterior labral repair (CPT 03727)   3. Shoulder arthroscopic posterior labral repair, capsulorrhaphy, 7:00 position (CPT 21778)  4. Shoulder arthroscopic extensive debridement (anterior, posterior glenohumeral joint) (CPT 66205)  5. Shoulder manipulation under anesthesia (CPT 78355)  6. Shoulder arthroscopic lysis of adhesions (CPT 95615):  7. Shoulder arthroscopic SLAP type 8 repair (CPT 31666)  8. Shoulder arthroscopic rotator cuff repair (rotator interval closure, supraspinatus to subscapularis, CPT 82638)    POST OPERATIVE PLAN: We will follow the arthroscopic Bankart repair and SLAP repair guidelines. We discussed with the patient's family after surgery. The patient will remain in a sling for 6 weeks. We will start PT at the 3 week wandy.     Quality of tissue: Good    Quality of the repair: Good    Subjective     Pt reports: Doctor wants me to come 2x but he was happy with progress, no PT while I'm in [place]  He was compliant with home exercise program.  Response to previous treatment: sore  Functional change: in sling with abd pillow    Pain: 4/10  Location: left  shoulder      Objective     Dieudonne received therapeutic exercises to develop strength, endurance, ROM, flexibility, and posture for 30 minutes including:    AAROM elbow flexion/extension 30x  Prone extension 3 x 10  Prone row 3 x 10  Supine wand flexion 30x  Patient education on new HEP    Dieudonne received the following manual therapy techniques: Joint mobilizations and Soft tissue Mobilization were applied to the: L shoulder for 20 minutes, including:  Gr I-II oscillations  Gr III flexion 0-140 degrees  Elbow extension to 0 deg  ER arm by side 0-30 deg      Dieudonne participated in neuromuscular re-education activities to improve: Coordination, Kinesthetic, Sense, Proprioception, and Posture for 10 minutes. The following activities were included:    Slot machines 30x  CC assisted scaption 7# 30x    Dieudonne participated in dynamic functional therapeutic activities to improve functional performance for 0  minutes, including:      Dieudonne received cold pack for 10 minutes to to decrease circulation, pain, and swelling.      Home Exercises Provided and Patient Education Provided     Education provided:   - post op protocol provided  - sling 23 hours  - full elbow extension    Written Home Exercises Provided: Patient instructed to cont prior HEP.  Exercises were reviewed and Dieudonne was able to demonstrate them prior to the end of the session.  Dieudonne demonstrated good  understanding of the education provided.     See EMR under Patient Instructions for exercises provided prior visit.    Assessment     Dieudonne progressed with wand flexion and assisted cable cord for scaption ROM. He is progressing well with his passive motion, struggled in sidelying but able to perform slot machines with mirror feedback. Given new HEP and sent to [facility], increased frequency to 2x a week      Dieudonne Is progressing well towards his goals.   Pt prognosis is Excellent.     Pt will continue to benefit from skilled outpatient physical therapy  to address the deficits listed in the problem list box on initial evaluation, provide pt/family education and to maximize pt's level of independence in the home and community environment.     Pt's spiritual, cultural and educational needs considered and pt agreeable to plan of care and goals.    Anticipated barriers to physical therapy: travel    GOALS: Short Term Goals:  3 weeks(Progressing, not met)  1.Report decreased shoulder pain < / =  3/10  to increase tolerance for return to ADL out the sling  2. Increase PROM return to 90 deg flexion and 10 deg ER   3. Increased strength by 1/3 MMT grade in  L scap to increase tolerance for ADL and work activities.  4. Pt to tolerate HEP to improve ROM and independence with ADL's     Long Term Goals: 24 weeks(Progressing, not met)  1.Report decreased shoulder pain  < / =  1 /10  to increase tolerance for return to basketball  2.Increase AROM to full motion in shoulder pain free  3.Increase strength to >/= 4/5 in L shoulder stabilizers to increase tolerance for ADL and work activities.  4. Pt goal: return to basketball without L shoulder pain   5. Pt will have improved gcode of CJ (20-40% limited) on FOTO shoulder in order to demonstrate true functional improvement.     Plan     Plan of care Certification: 4/11/2023 to 10/11/2023.    Continue to progress through SLAP/Bankart protocol    Devendra Plummer, PT, DPT, OCS, FAAOMPT

## 2023-05-02 NOTE — TELEPHONE ENCOUNTER
Spoke to Ms. Kathy about the [employer] form that was completed on 4/24. Informed her it was faxed back to [facility] that same day as well as attached to a message sent to Dieudonne in the portal. Answered any other question she had.

## 2023-05-03 ENCOUNTER — ATHLETIC TRAINING SESSION (OUTPATIENT)
Dept: SPORTS MEDICINE | Facility: CLINIC | Age: 18
End: 2023-05-03
Payer: COMMERCIAL

## 2023-05-03 NOTE — PROGRESS NOTES
Dieudonne completed:    [x]  INJURY TREATMENT   []  MAINTENANCE  DATE OF SERVICE: 05/03/2023  INJURY/CONDITON: 6 week post op Left Shoulder    Dieudonne received the selected modalities after being cleared for contradictions.  Dieudonne received education on potenital side effects of the selected modalities and agreed to treatment.      MODALITIES:    Cryotherapy / Thermotherapy Duration  (Mins) Add. Tx Parameters / Comment   []Cold Tub / Whirlpool (50-60 F)     []Contrast Bath (105-110 F & 50-65 F)     []Game Ready     []Hot Pack     []Hot Tub / Whirlpool ( F)     []Ice Massage     []Ice Pack     []Paraffin Wax (126-130 F)     []Vapocoolant Spray        Comment:       Electrotherapy Waveform   (AC/DC) Modulation (Cont./Interrupted/Surged) Intensity   (V) Pulse Width/Dur.  (uS) Pulse Rate/Freq.  (Hz, PPS or CPS) Duration  (Mins) Add. Tx Parameters / Comment   []Combo          []E-Stim - IFC          []E-Stim - Premod          []E-Stim - [place]          []E-Stim - TENS          []E-Stim - Other          []Iontophoresis        Meds:     Comment:      Ultrasound Duty Cycle   (%) Freq.  (Mhz) Intensity   (w/cm2) Duration  (Mins) Add. Tx Parameters / Comment   []Combo        []Phonophoresis     Meds:   []Ultrasound         []Ultrasound and E-Stim          Comment:        Massage Duration  (Mins) Add. Tx Parameters / Comment   []Massage - IASTM     []Massage - Scar Tissue     []Massage - Self Administered     []Massage - Therapeutic     []Myofascial Release        Comment:      Other Modalities Duration  (Mins)  Add. Tx Parameters / Comment   []Active Release     []Cupping     []Dry Needling     []Intermittent Compression      []Laser     []Lightwave     []Traction      []Other:       Comment:      THERAPEUTIC EXERCISES:    Stretching Cardio Rehab Other   []Stretching - Active []Cardio - Bike []Rehab - Ankle/Foot []Agility []PNF   []Stretching - Dynamic []Cardio - Elliptical []Rehab - Knee []Balance []ROM - Active    []Stretching - Passive []Cardio - Jog/Run []Rehab - Hip []Blood Flow Restriction []ROM - Passive   []Stretching - PNF []Cardio - Treadmill []Rehab - Wrist/Hand []Foam Roller []RTP - Concussion Protocol   []Stretching - Static []Cardio - Upper Body Ergometer []Rehab - Elbow [x]Functional Exercises []RTP - Sport Specific    []Cardio - Walk [x]Rehab - Shoulder []Joint Mobilization []Strengthening Exercises     []Rehab - Neck/Spine []Manual Therapy []Other:     []Rehab - Back []Plyometric Exercises      []Rehab - Other       Comment:            Warm-Up Reps/Sets/Time Weight #                         Exercise Reps/Sets/Time Weight #   Shoulder frontal flexion-Supine-Stick 1x30    AROM side lying flexion 3x8    Wallslides 1x20    Wand flexion press 2x15                                    Comment:      Miscellaneous Add. Tx Parameters / Comment   []Compression Wrap    []Support Wrap    []Taping - Preventative    []Taping - Injured Part    []Wound Care    []Other:      Comment:

## 2023-05-05 ENCOUNTER — CLINICAL SUPPORT (OUTPATIENT)
Dept: REHABILITATION | Facility: HOSPITAL | Age: 18
End: 2023-05-05
Payer: COMMERCIAL

## 2023-05-05 DIAGNOSIS — M25.512 ACUTE PAIN OF LEFT SHOULDER: Primary | ICD-10-CM

## 2023-05-05 DIAGNOSIS — M25.619 LIMITED RANGE OF MOTION (ROM) OF SHOULDER: ICD-10-CM

## 2023-05-05 PROCEDURE — 97112 NEUROMUSCULAR REEDUCATION: CPT | Performed by: PHYSICAL THERAPIST

## 2023-05-05 PROCEDURE — 97140 MANUAL THERAPY 1/> REGIONS: CPT | Performed by: PHYSICAL THERAPIST

## 2023-05-05 PROCEDURE — 97110 THERAPEUTIC EXERCISES: CPT | Performed by: PHYSICAL THERAPIST

## 2023-05-05 NOTE — PROGRESS NOTES
Physical Therapy Daily Treatment Note     Name: Dieudonne Martin  Clinic Number: [id number]    Therapy Diagnosis:   Encounter Diagnoses   Name Primary?    Acute pain of left shoulder Yes    Limited range of motion (ROM) of shoulder      Physician: Augustus Sepulveda MD    Visit Date: 5/5/2023  Physician Orders: PT Eval and Treat   Medical Diagnosis from Referral:   M25.312 (ICD-10-CM) - Instability of left shoulder joint   M75.22 (ICD-10-CM) - Biceps tendonitis on left   Evaluation Date: 4/11/2023  Authorization Period Expiration: 12/31/2023  Plan of Care Expiration: 10/11/2023  Visit # / Visits authorized: 7/ 20    Time In: 1000  Time Out: 1100  Total Billable Time: 60 minutes    Precautions: Standard    OPERATION:   left  1. Shoulder arthroscopic anterior capsulorrhaphy (CPT 39199) (complex, -22 modifier)  2. Shoulder arthroscopic anterior labral repair (CPT 28926)   3. Shoulder arthroscopic posterior labral repair, capsulorrhaphy, 7:00 position (CPT 03402)  4. Shoulder arthroscopic extensive debridement (anterior, posterior glenohumeral joint) (CPT 88236)  5. Shoulder manipulation under anesthesia (CPT 79044)  6. Shoulder arthroscopic lysis of adhesions (CPT 43368):  7. Shoulder arthroscopic SLAP type 8 repair (CPT 83445)  8. Shoulder arthroscopic rotator cuff repair (rotator interval closure, supraspinatus to subscapularis, CPT 41365)    POST OPERATIVE PLAN: We will follow the arthroscopic Bankart repair and SLAP repair guidelines. We discussed with the patient's family after surgery. The patient will remain in a sling for 6 weeks. We will start PT at the 3 week wandy.     Quality of tissue: Good    Quality of the repair: Good    Subjective     Pt reports: Did my exercises with G, it was sore but doing okay. Did my core/abs this morning  He was compliant with home exercise program.  Response to previous treatment: sore  Functional change: in sling with abd pillow    Pain: 4/10  Location: left shoulder       Objective   PROM standign to 150 degrees     Dieudonne received therapeutic exercises to develop strength, endurance, ROM, flexibility, and posture for 25 minutes including:    UBE standing AAROM for cardio endurance training  Prone extension 3 x 10  Prone row 3 x 10  Supine wand flexion 30x  Patient education on new HEP    Dieudonne received the following manual therapy techniques: Joint mobilizations and Soft tissue Mobilization were applied to the: L shoulder for 20 minutes, including:    Gr I-II oscillations  Gr III flexion 0-140 degrees  Elbow extension to 0 deg  ER arm by side 0-30 deg      Dieudonne participated in neuromuscular re-education activities to improve: Coordination, Kinesthetic, Sense, Proprioception, and Posture for 15 minutes. The following activities were included:    Slot machines 30x  Hitch hiker 20x   -lift off 20x  AAROM flexion LUE press down on RUE 2 x 10    Dieudonne participated in dynamic functional therapeutic activities to improve functional performance for 0  minutes, including:      Dieudonne received cold pack for 10 minutes to to decrease circulation, pain, and swelling.      Home Exercises Provided and Patient Education Provided     Education provided:   - post op protocol provided  - sling 23 hours  - full elbow extension    Written Home Exercises Provided: Patient instructed to cont prior HEP.  Exercises were reviewed and Dieudonne was able to demonstrate them prior to the end of the session.  Dieudonne demonstrated good  understanding of the education provided.     See EMR under Patient Instructions for exercises provided prior visit.    Assessment     Dieudonne progressed AAROM, improved activation of low trap and active flexion follow hitch hikers. Better tolerance to slot machine, given AAROM with LUE pressing down on the RUE.     Dieudonne Is progressing well towards his goals.   Pt prognosis is Excellent.     Pt will continue to benefit from skilled outpatient physical therapy to  address the deficits listed in the problem list box on initial evaluation, provide pt/family education and to maximize pt's level of independence in the home and community environment.     Pt's spiritual, cultural and educational needs considered and pt agreeable to plan of care and goals.    Anticipated barriers to physical therapy: travel    GOALS: Short Term Goals:  3 weeks(Progressing, not met)  1.Report decreased shoulder pain < / =  3/10  to increase tolerance for return to ADL out the sling  2. Increase PROM return to 90 deg flexion and 10 deg ER   3. Increased strength by 1/3 MMT grade in  L scap to increase tolerance for ADL and work activities.  4. Pt to tolerate HEP to improve ROM and independence with ADL's     Long Term Goals: 24 weeks(Progressing, not met)  1.Report decreased shoulder pain  < / =  1 /10  to increase tolerance for return to basketball  2.Increase AROM to full motion in shoulder pain free  3.Increase strength to >/= 4/5 in L shoulder stabilizers to increase tolerance for ADL and work activities.  4. Pt goal: return to basketball without L shoulder pain   5. Pt will have improved gcode of CJ (20-40% limited) on FOTO shoulder in order to demonstrate true functional improvement.     Plan     Plan of care Certification: 4/11/2023 to 10/11/2023.    Continue to progress through SLAP/Bankart protocol    Devendra Plummer, PT, DPT, OCS, FAAOMPT

## 2023-05-09 ENCOUNTER — CLINICAL SUPPORT (OUTPATIENT)
Dept: REHABILITATION | Facility: HOSPITAL | Age: 18
End: 2023-05-09
Payer: COMMERCIAL

## 2023-05-09 DIAGNOSIS — M25.619 LIMITED RANGE OF MOTION (ROM) OF SHOULDER: ICD-10-CM

## 2023-05-09 DIAGNOSIS — M25.512 ACUTE PAIN OF LEFT SHOULDER: Primary | ICD-10-CM

## 2023-05-09 PROCEDURE — 97112 NEUROMUSCULAR REEDUCATION: CPT | Performed by: PHYSICAL THERAPIST

## 2023-05-09 PROCEDURE — 97110 THERAPEUTIC EXERCISES: CPT | Performed by: PHYSICAL THERAPIST

## 2023-05-09 PROCEDURE — 97140 MANUAL THERAPY 1/> REGIONS: CPT | Performed by: PHYSICAL THERAPIST

## 2023-05-09 NOTE — PROGRESS NOTES
Physical Therapy Daily Treatment Note     Name: Dieudonne Martin  [facility] Number: [id number]    Therapy Diagnosis:   Encounter Diagnoses   Name Primary?    Acute pain of left shoulder Yes    Limited range of motion (ROM) of shoulder      Physician: Milton Yap III, *    Visit Date: 5/9/2023  Physician Orders: PT Eval and Treat   Medical Diagnosis from Referral:   M25.312 (ICD-10-CM) - Instability of left shoulder joint   M75.22 (ICD-10-CM) - Biceps tendonitis on left   Evaluation Date: 4/11/2023  Authorization Period Expiration: 12/31/2023  Plan of Care Expiration: 10/11/2023  Visit # / Visits authorized: 8/ 20    Time In: 1400  Time Out: 1500  Total Billable Time: 60 minutes    Precautions: Standard    OPERATION:   left  1. Shoulder arthroscopic anterior capsulorrhaphy (CPT 32291) (complex, -22 modifier)  2. Shoulder arthroscopic anterior labral repair (CPT 43429)   3. Shoulder arthroscopic posterior labral repair, capsulorrhaphy, 7:00 position (CPT 16873)  4. Shoulder arthroscopic extensive debridement (anterior, posterior glenohumeral joint) (CPT 81678)  5. Shoulder manipulation under anesthesia (CPT 26678)  6. Shoulder arthroscopic lysis of adhesions (CPT 90183):  7. Shoulder arthroscopic SLAP type 8 repair (CPT 69579)  8. Shoulder arthroscopic rotator cuff repair (rotator interval closure, supraspinatus to subscapularis, CPT 75788)    POST OPERATIVE PLAN: We will follow the arthroscopic Bankart repair and SLAP repair guidelines. We discussed with the patient's family after surgery. The patient will remain in a sling for 6 weeks. We will start PT at the 3 week wandy.     Quality of tissue: Good    Quality of the repair: Good    Subjective     Pt reports: My motion is getting better in the shoulder  He was compliant with home exercise program.  Response to previous treatment: sore  Functional change: in sling with abd pillow    Pain: 4/10  Location: left shoulder      Objective   AROM pre session 85 deg, post  session 125 deg     Dieudonne received therapeutic exercises to develop strength, endurance, ROM, flexibility, and posture for 20 minutes including:    Sidelying dowel flexion   Prone extension 1# 3 x 10  Prone row 1# 3 x 10  Supine wand flexion 30x  Patient education on new HEP    Dieudonne received the following manual therapy techniques: Joint mobilizations and Soft tissue Mobilization were applied to the: L shoulder for 15 minutes, including:    Gr I-II oscillations  Gr III flexion 0-140 degrees  Elbow extension to 0 deg  ER arm by side 0-35 deg    Dieudonne participated in neuromuscular re-education activities to improve: Coordination, Kinesthetic, Sense, Proprioception, and Posture for 25 minutes. The following activities were included:    Slot machines 30x  Hitch hiker 20x   -lift off 20x  AAROM flexion LUE press down on RUE 2 x 10  Swiss ball roll up wall 20x     Dieudonne participated in dynamic functional therapeutic activities to improve functional performance for 0  minutes, including:      Dieudonne received cold pack for 10 minutes to to decrease circulation, pain, and swelling.      Home Exercises Provided and Patient Education Provided     Education provided:   - improve cuff stabilization     Written Home Exercises Provided: Patient instructed to cont prior HEP.  Exercises were reviewed and Dieudonne was able to demonstrate them prior to the end of the session.  Dieudonne demonstrated good  understanding of the education provided.     See EMR under Patient Instructions for exercises provided prior visit.    Assessment     Dieudonne is progressing AAROM and cuff stabilization. On arrival limited flexion, improved by end of session. Unable to perform serratus press with OTB. Will progress strengthening as tolerated     Dieudonne Is progressing well towards his goals.   Pt prognosis is Excellent.     Pt will continue to benefit from skilled outpatient physical therapy to address the deficits listed in the problem list  box on initial evaluation, provide pt/family education and to maximize pt's level of independence in the home and community environment.     Pt's spiritual, cultural and educational needs considered and pt agreeable to plan of care and goals.    Anticipated barriers to physical therapy: travel    GOALS: Short Term Goals:  3 weeks(Progressing, not met)  1.Report decreased shoulder pain < / =  3/10  to increase tolerance for return to ADL out the sling  2. Increase PROM return to 90 deg flexion and 10 deg ER   3. Increased strength by 1/3 MMT grade in  L scap to increase tolerance for ADL and work activities.  4. Pt to tolerate HEP to improve ROM and independence with ADL's     Long Term Goals: 24 weeks(Progressing, not met)  1.Report decreased shoulder pain  < / =  1 /10  to increase tolerance for return to basketball  2.Increase AROM to full motion in shoulder pain free  3.Increase strength to >/= 4/5 in L shoulder stabilizers to increase tolerance for ADL and work activities.  4. Pt goal: return to basketball without L shoulder pain   5. Pt will have improved gcode of CJ (20-40% limited) on FOTO shoulder in order to demonstrate true functional improvement.     Plan     Plan of care Certification: 4/11/2023 to 10/11/2023.    Continue to progress through SLAP/Bankart protocol    Devendra Plummer, PT, DPT, OCS, FAAOMPT

## 2023-05-11 ENCOUNTER — CLINICAL SUPPORT (OUTPATIENT)
Dept: REHABILITATION | Facility: HOSPITAL | Age: 18
End: 2023-05-11
Payer: COMMERCIAL

## 2023-05-11 DIAGNOSIS — M25.512 ACUTE PAIN OF LEFT SHOULDER: Primary | ICD-10-CM

## 2023-05-11 DIAGNOSIS — M25.619 LIMITED RANGE OF MOTION (ROM) OF SHOULDER: ICD-10-CM

## 2023-05-11 PROCEDURE — 97140 MANUAL THERAPY 1/> REGIONS: CPT | Performed by: PHYSICAL THERAPIST

## 2023-05-11 PROCEDURE — 97110 THERAPEUTIC EXERCISES: CPT | Performed by: PHYSICAL THERAPIST

## 2023-05-11 PROCEDURE — 97112 NEUROMUSCULAR REEDUCATION: CPT | Performed by: PHYSICAL THERAPIST

## 2023-05-11 NOTE — PROGRESS NOTES
Physical Therapy Daily Treatment Note     Name: Dieudonne Martin  Clinic Number: [id number]    Therapy Diagnosis:   Encounter Diagnoses   Name Primary?    Acute pain of left shoulder Yes    Limited range of motion (ROM) of shoulder      Physician: Augustus Sepulveda MD    Visit Date: 5/11/2023  Physician Orders: PT Eval and Treat   Medical Diagnosis from Referral:   M25.312 (ICD-10-CM) - Instability of left shoulder joint   M75.22 (ICD-10-CM) - Biceps tendonitis on left   Evaluation Date: 4/11/2023  Authorization Period Expiration: 12/31/2023  Plan of Care Expiration: 10/11/2023  Visit # / Visits authorized: 9/ 20    Time In: 1400  Time Out: 1500  Total Billable Time: 60 minutes    Precautions: Standard    OPERATION:   left  1. Shoulder arthroscopic anterior capsulorrhaphy (CPT 24190) (complex, -22 modifier)  2. Shoulder arthroscopic anterior labral repair (CPT 92765)   3. Shoulder arthroscopic posterior labral repair, capsulorrhaphy, 7:00 position (CPT 35162)  4. Shoulder arthroscopic extensive debridement (anterior, posterior glenohumeral joint) (CPT 21789)  5. Shoulder manipulation under anesthesia (CPT 44518)  6. Shoulder arthroscopic lysis of adhesions (CPT 32097):  7. Shoulder arthroscopic SLAP type 8 repair (CPT 88990)  8. Shoulder arthroscopic rotator cuff repair (rotator interval closure, supraspinatus to subscapularis, CPT 78077)    POST OPERATIVE PLAN: We will follow the arthroscopic Bankart repair and SLAP repair guidelines. We discussed with the patient's family after surgery. The patient will remain in a sling for 6 weeks. We will start PT at the 3 week wandy.     Quality of tissue: Good    Quality of the repair: Good    Subjective     Pt reports: Shoulder is feeling better, I can reach higher  He was compliant with home exercise program.  Response to previous treatment: sore  Functional change: in sling with abd pillow    Pain: 4/10  Location: left shoulder      Objective   AROM pre session 115  AAROM on  wall 140 deg    Dieudonne received therapeutic exercises to develop strength, endurance, ROM, flexibility, and posture for 25 minutes including:    Supine wand flexion 30x  Prone extension 2# 3 x 10  Lateral walks GTB above knees with med ball press 4 laps  Patient education on new HEP    NT  Sidelying dowel flexion   Prone row 1# 3 x 10    Dieudonne received the following manual therapy techniques: Joint mobilizations and Soft tissue Mobilization were applied to the: L shoulder for 15 minutes, including:    Gr I-II oscillations  Gr III flexion 0-140 degrees  Elbow extension to 0 deg  ER arm by side 0-35 deg    Dieudonne participated in neuromuscular re-education activities to improve: Coordination, Kinesthetic, Sense, Proprioception, and Posture for 20 minutes. The following activities were included:    Foam roll serratus wall slide 2 x 15  Ball on wall flexion 3x to fatigue   Swiss ball iso hold with leg lowering (low abd) 3 x 10    NT  Slot machines 30x  Hitch hiker 20x   -lift off 20x  AAROM flexion LUE press down on RUE 2 x 10  Swiss ball roll up wall 20x     Dieudonne participated in dynamic functional therapeutic activities to improve functional performance for 0  minutes, including:      Dieudonne received cold pack for 10 minutes to to decrease circulation, pain, and swelling.      Home Exercises Provided and Patient Education Provided     Education provided:   - improve cuff stabilization     Written Home Exercises Provided: Patient instructed to cont prior HEP.  Exercises were reviewed and Dieudonne was able to demonstrate them prior to the end of the session.  Dieudonne demonstrated good  understanding of the education provided.     See EMR under Patient Instructions for exercises provided prior visit.    Assessment     Dieudonne progressed with AAROM and serratus stabilization. Progressing with strengthening to his cuff with walkouts. He progressed core strengthening with lateral walks and leg lowering    Dieudonne Is  progressing well towards his goals.   Pt prognosis is Excellent.     Pt will continue to benefit from skilled outpatient physical therapy to address the deficits listed in the problem list box on initial evaluation, provide pt/family education and to maximize pt's level of independence in the home and community environment.     Pt's spiritual, cultural and educational needs considered and pt agreeable to plan of care and goals.    Anticipated barriers to physical therapy: travel    GOALS: Short Term Goals:  3 weeks(Progressing, not met)  1.Report decreased shoulder pain < / =  3/10  to increase tolerance for return to ADL out the sling  2. Increase PROM return to 90 deg flexion and 10 deg ER   3. Increased strength by 1/3 MMT grade in  L scap to increase tolerance for ADL and work activities.  4. Pt to tolerate HEP to improve ROM and independence with ADL's     Long Term Goals: 24 weeks(Progressing, not met)  1.Report decreased shoulder pain  < / =  1 /10  to increase tolerance for return to basketball  2.Increase AROM to full motion in shoulder pain free  3.Increase strength to >/= 4/5 in L shoulder stabilizers to increase tolerance for ADL and work activities.  4. Pt goal: return to basketball without L shoulder pain   5. Pt will have improved gcode of CJ (20-40% limited) on FOTO shoulder in order to demonstrate true functional improvement.     Plan     Plan of care Certification: 4/11/2023 to 10/11/2023.    Continue to progress through SLAP/Bankart protocol    Devendra Plummer, PT, DPT, OCS, FAAOMPT

## 2023-05-15 ENCOUNTER — CLINICAL SUPPORT (OUTPATIENT)
Dept: REHABILITATION | Facility: HOSPITAL | Age: 18
End: 2023-05-15
Payer: COMMERCIAL

## 2023-05-15 DIAGNOSIS — M25.512 ACUTE PAIN OF LEFT SHOULDER: Primary | ICD-10-CM

## 2023-05-15 DIAGNOSIS — M25.619 LIMITED RANGE OF MOTION (ROM) OF SHOULDER: ICD-10-CM

## 2023-05-15 PROCEDURE — 97140 MANUAL THERAPY 1/> REGIONS: CPT | Performed by: PHYSICAL THERAPIST

## 2023-05-15 PROCEDURE — 97110 THERAPEUTIC EXERCISES: CPT | Performed by: PHYSICAL THERAPIST

## 2023-05-15 PROCEDURE — 97112 NEUROMUSCULAR REEDUCATION: CPT | Performed by: PHYSICAL THERAPIST

## 2023-05-15 NOTE — PROGRESS NOTES
Physical Therapy Daily Treatment Note     Name: Dieudonne Martin  Clinic Number: [id number]    Therapy Diagnosis:   Encounter Diagnoses   Name Primary?    Acute pain of left shoulder Yes    Limited range of motion (ROM) of shoulder      Physician: Augustus Sepulveda MD    Visit Date: 5/15/2023  Physician Orders: PT Eval and Treat   Medical Diagnosis from Referral:   M25.312 (ICD-10-CM) - Instability of left shoulder joint   M75.22 (ICD-10-CM) - Biceps tendonitis on left   Evaluation Date: 4/11/2023  Authorization Period Expiration: 12/31/2023  Plan of Care Expiration: 10/11/2023  Visit # / Visits authorized: 10/ 20    Time In: 1500  Time Out: 1600  Total Billable Time: 60 minutes    Precautions: Standard    OPERATION:   left  1. Shoulder arthroscopic anterior capsulorrhaphy (CPT 12271) (complex, -22 modifier)  2. Shoulder arthroscopic anterior labral repair (CPT 01883)   3. Shoulder arthroscopic posterior labral repair, capsulorrhaphy, 7:00 position (CPT 98491)  4. Shoulder arthroscopic extensive debridement (anterior, posterior glenohumeral joint) (CPT 63920)  5. Shoulder manipulation under anesthesia (CPT 61168)  6. Shoulder arthroscopic lysis of adhesions (CPT 02896):  7. Shoulder arthroscopic SLAP type 8 repair (CPT 74303)  8. Shoulder arthroscopic rotator cuff repair (rotator interval closure, supraspinatus to subscapularis, CPT 67387)    POST OPERATIVE PLAN: We will follow the arthroscopic Bankart repair and SLAP repair guidelines. We discussed with the patient's family after surgery. The patient will remain in a sling for 6 weeks. We will start PT at the 3 week wandy.     Quality of tissue: Good    Quality of the repair: Good    Subjective     Pt reports: Using my shoulder for more at home, I can tell its getting better and stronger. I think I could hit my jump shot now   He was compliant with home exercise program.  Response to previous treatment: sore  Functional change: using LUE more at home     Pain:  "4/10  Location: left shoulder      Objective   AROM pre session 130, 150 post session      Dieudonne received therapeutic exercises to develop strength, endurance, ROM, flexibility, and posture for 15 minutes including:    Sidelying ER 3 x 15   Serratus press GTB 3 x 8  Patient education on new HEP    NT  Supine wand flexion 30x  Prone extension 2# 3 x 10  Lateral walks GTB above knees with med ball press 4 laps  Sidelying dowel flexion   Prone row 1# 3 x 10    Dieudonne received the following manual therapy techniques: Joint mobilizations and Soft tissue Mobilization were applied to the: L shoulder for 15 minutes, including:    Gr I-II oscillations  Gr III flexion 0-150 degrees  Elbow extension to 0 deg  ER arm by side 0-45 deg    Dieudonne participated in neuromuscular re-education activities to improve: Coordination, Kinesthetic, Sense, Proprioception, and Posture for 30 minutes. The following activities were included:    Pot stir 3x fatigue at wall  CC pull down scaption 7# 2 x 20  Rouse ropes 20" 5x   ER walkouts GTB 2 x 10  Hitch hiker 20x   -lift off with pertubations 20x    NT  Foam roll serratus wall slide 2 x 15  Ball on wall flexion 3x to fatigue   Swiss ball iso hold with leg lowering (low abd) 3 x 10  Slot machines 30x    AAROM flexion LUE press down on RUE 2 x 10  Swiss ball roll up wall 20x     Dieudonne participated in dynamic functional therapeutic activities to improve functional performance for 0  minutes, including:      Dieudonne received cold pack for 10 minutes to to decrease circulation, pain, and swelling.      Home Exercises Provided and Patient Education Provided     Education provided:   - improve cuff stabilization     Written Home Exercises Provided: Patient instructed to cont prior HEP.  Exercises were reviewed and Dieudonne was able to demonstrate them prior to the end of the session.  Dieudonne demonstrated good  understanding of the education provided.     See EMR under Patient Instructions for " exercises provided prior visit.    Assessment     Dieudonne is progressing well with improved AROM on arrival and strength. He was able to perform serratus press today for first time, unable last week. Improved low trap activation following hitch hiker and CC pull downs    Dieudonne Is progressing well towards his goals.   Pt prognosis is Excellent.     Pt will continue to benefit from skilled outpatient physical therapy to address the deficits listed in the problem list box on initial evaluation, provide pt/family education and to maximize pt's level of independence in the home and community environment.     Pt's spiritual, cultural and educational needs considered and pt agreeable to plan of care and goals.    Anticipated barriers to physical therapy: travel    GOALS: Short Term Goals:  3 weeks(Progressing, not met)  1.Report decreased shoulder pain < / =  3/10  to increase tolerance for return to ADL out the sling  2. Increase PROM return to 90 deg flexion and 10 deg ER   3. Increased strength by 1/3 MMT grade in  L scap to increase tolerance for ADL and work activities.  4. Pt to tolerate HEP to improve ROM and independence with ADL's     Long Term Goals: 24 weeks(Progressing, not met)  1.Report decreased shoulder pain  < / =  1 /10  to increase tolerance for return to basketball  2.Increase AROM to full motion in shoulder pain free  3.Increase strength to >/= 4/5 in L shoulder stabilizers to increase tolerance for ADL and work activities.  4. Pt goal: return to basketball without L shoulder pain   5. Pt will have improved gcode of CJ (20-40% limited) on FOTO shoulder in order to demonstrate true functional improvement.     Plan     Plan of care Certification: 4/11/2023 to 10/11/2023.    Continue to progress through SLAP/Bankart protocol    Devendra Plummer, PT, DPT, OCS, FAAOMPT

## 2023-05-16 ENCOUNTER — CLINICAL SUPPORT (OUTPATIENT)
Dept: REHABILITATION | Facility: HOSPITAL | Age: 18
End: 2023-05-16
Payer: COMMERCIAL

## 2023-05-16 DIAGNOSIS — M25.619 LIMITED RANGE OF MOTION (ROM) OF SHOULDER: ICD-10-CM

## 2023-05-16 DIAGNOSIS — M25.512 ACUTE PAIN OF LEFT SHOULDER: Primary | ICD-10-CM

## 2023-05-16 PROCEDURE — 97140 MANUAL THERAPY 1/> REGIONS: CPT | Performed by: PHYSICAL THERAPIST

## 2023-05-16 PROCEDURE — 97530 THERAPEUTIC ACTIVITIES: CPT | Performed by: PHYSICAL THERAPIST

## 2023-05-16 PROCEDURE — 97112 NEUROMUSCULAR REEDUCATION: CPT | Performed by: PHYSICAL THERAPIST

## 2023-05-16 NOTE — PROGRESS NOTES
Physical Therapy Daily Treatment Note     Name: Dieudonne Martin  Clinic Number: [id number]    Therapy Diagnosis:   Encounter Diagnoses   Name Primary?    Acute pain of left shoulder Yes    Limited range of motion (ROM) of shoulder      Physician: Augustus Sepulveda MD    Visit Date: 5/16/2023  Physician Orders: PT Eval and Treat   Medical Diagnosis from Referral:   M25.312 (ICD-10-CM) - Instability of left shoulder joint   M75.22 (ICD-10-CM) - Biceps tendonitis on left   Evaluation Date: 4/11/2023  Authorization Period Expiration: 12/31/2023  Plan of Care Expiration: 10/11/2023  Visit # / Visits authorized: 11/ 20    Time In: 1400  Time Out: 1500  Total Billable Time: 60 minutes    Precautions: Standard    OPERATION:   left  1. Shoulder arthroscopic anterior capsulorrhaphy (CPT 42635) (complex, -22 modifier)  2. Shoulder arthroscopic anterior labral repair (CPT 88393)   3. Shoulder arthroscopic posterior labral repair, capsulorrhaphy, 7:00 position (CPT 74693)  4. Shoulder arthroscopic extensive debridement (anterior, posterior glenohumeral joint) (CPT 89571)  5. Shoulder manipulation under anesthesia (CPT 52032)  6. Shoulder arthroscopic lysis of adhesions (CPT 86767):  7. Shoulder arthroscopic SLAP type 8 repair (CPT 08552)  8. Shoulder arthroscopic rotator cuff repair (rotator interval closure, supraspinatus to subscapularis, CPT 42587)    POST OPERATIVE PLAN: We will follow the arthroscopic Bankart repair and SLAP repair guidelines. We discussed with the patient's family after surgery. The patient will remain in a sling for 6 weeks. We will start PT at the 3 week wandy.     Quality of tissue: Good    Quality of the repair: Good    Subjective     Pt reports: I'm sore from yesterday. Going home on Thursday until start of the month  He was compliant with home exercise program.  Response to previous treatment: sore  Functional change: using LUE more at home     Pain: 4/10  Location: left shoulder      Objective   AROM  "pre session 130, 150 post session      Dieudonne received therapeutic exercises to develop strength, endurance, ROM, flexibility, and posture for 0 minutes including:      Patient education on new HEP    NT  Sidelying ER 3 x 15   Serratus press GTB 3 x 8  Supine wand flexion 30x  Prone extension 2# 3 x 10    Sidelying dowel flexion   Prone row 1# 3 x 10    Dieudonne received the following manual therapy techniques: Joint mobilizations and Soft tissue Mobilization were applied to the: L shoulder for 15 minutes, including:    Gr I-II oscillations  Gr III flexion 0-150 degrees  Elbow extension to 0 deg  ER arm by side 0-45 deg    Dieudonne participated in neuromuscular re-education activities to improve: Coordination, Kinesthetic, Sense, Proprioception, and Posture for 30 minutes. The following activities were included:    Mod plank Pot stir  swiss ball 30" 3x   Body blade 30" 3x  Foam roll serratus wall slide 2 x 15  ER walkouts GTB 2 x 10  Serratus HHR 2 x 8    NT  CC pull down scaption 7# 2 x 20  Rouse ropes 20" 5x   Hitch hiker 20x   -lift off with pertubations 20x  Ball on wall flexion 3x to fatigue   Swiss ball iso hold with leg lowering (low abd) 3 x 10  Slot machines 30x    AAROM flexion LUE press down on RUE 2 x 10  Swiss ball roll up wall 20x     Dieudonne participated in dynamic functional therapeutic activities to improve functional performance for 15 minutes, including:    Standing UBE 8' level 7 to improve cardio endurance, functional reaching, push/pull  Lateral walks GTB above knees with 4# med ball press 4 laps - mimic basketball defensive sliding    Dieudonne received cold pack for 0 minutes to to decrease circulation, pain, and swelling.      Home Exercises Provided and Patient Education Provided     Education provided:   - improve cuff stabilization     Written Home Exercises Provided: Patient instructed to cont prior HEP.  Exercises were reviewed and Dieudonne was able to demonstrate them prior to the end of " the session.  Dieudonne demonstrated good  understanding of the education provided.     See EMR under Patient Instructions for exercises provided prior visit.    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 Improving his passive motion, still struggles with hiking due to muscular weakness as expected at this phase post op. Needs to improve cuff strength while back home.     Dieudonne Is progressing well towards his goals.   Pt prognosis is Excellent.     Pt will continue to benefit from skilled outpatient physical therapy to address the deficits listed in the problem list box on initial evaluation, provide pt/family education and to maximize pt's level of independence in the home and community environment.     Pt's spiritual, cultural and educational needs considered and pt agreeable to plan of care and goals.    Anticipated barriers to physical therapy: travel    GOALS: Short Term Goals:  3 weeks(Progressing, not met)  1.Report decreased shoulder pain < / =  3/10  to increase tolerance for return to ADL out the sling  2. Increase PROM return to 90 deg flexion and 10 deg ER   3. Increased strength by 1/3 MMT grade in  L scap to increase tolerance for ADL and work activities.  4. Pt to tolerate HEP to improve ROM and independence with ADL's     Long Term Goals: 24 weeks(Progressing, not met)  1.Report decreased shoulder pain  < / =  1 /10  to increase tolerance for return to basketball  2.Increase AROM to full motion in shoulder pain free  3.Increase strength to >/= 4/5 in L shoulder stabilizers to increase tolerance for ADL and work activities.  4. Pt goal: return to basketball without L shoulder pain   5. Pt will have improved gcode of CJ (20-40% limited) on FOTO shoulder in order to demonstrate true functional improvement.     Plan     Plan of care Certification: 4/11/2023 to 10/11/2023.    Continue to progress through SLAP/Bankart protocol    Devendra Plummer, PT, DPT, OCS,  FAAOMPT

## 2023-06-06 ENCOUNTER — CLINICAL SUPPORT (OUTPATIENT)
Dept: REHABILITATION | Facility: HOSPITAL | Age: 18
End: 2023-06-06
Attending: PHYSICIAN ASSISTANT
Payer: COMMERCIAL

## 2023-06-06 DIAGNOSIS — M25.619 LIMITED RANGE OF MOTION (ROM) OF SHOULDER: ICD-10-CM

## 2023-06-06 DIAGNOSIS — M25.512 ACUTE PAIN OF LEFT SHOULDER: Primary | ICD-10-CM

## 2023-06-06 PROCEDURE — 97112 NEUROMUSCULAR REEDUCATION: CPT | Performed by: PHYSICAL THERAPIST

## 2023-06-06 PROCEDURE — 97140 MANUAL THERAPY 1/> REGIONS: CPT | Performed by: PHYSICAL THERAPIST

## 2023-06-06 PROCEDURE — 97530 THERAPEUTIC ACTIVITIES: CPT | Performed by: PHYSICAL THERAPIST

## 2023-06-07 NOTE — PROGRESS NOTES
Physical Therapy Daily Treatment Note     Name: Dieudonne Martin  Clinic Number: [id number]    Therapy Diagnosis:   Encounter Diagnoses   Name Primary?    Acute pain of left shoulder Yes    Limited range of motion (ROM) of shoulder      Physician: Augustus Sepulveda MD    Visit Date: 6/6/2023  Physician Orders: PT Eval and Treat   Medical Diagnosis from Referral:   M25.312 (ICD-10-CM) - Instability of left shoulder joint   M75.22 (ICD-10-CM) - Biceps tendonitis on left   Evaluation Date: 4/11/2023  Authorization Period Expiration: 12/31/2023  Plan of Care Expiration: 10/11/2023  Visit # / Visits authorized: 12/ 20    Time In: 1400  Time Out: 1453  Total Billable Time: 53 minutes    Precautions: Standard    OPERATION:   left  1. Shoulder arthroscopic anterior capsulorrhaphy (CPT 18835) (complex, -22 modifier)  2. Shoulder arthroscopic anterior labral repair (CPT 07629)   3. Shoulder arthroscopic posterior labral repair, capsulorrhaphy, 7:00 position (CPT 77876)  4. Shoulder arthroscopic extensive debridement (anterior, posterior glenohumeral joint) (CPT 65545)  5. Shoulder manipulation under anesthesia (CPT 59191)  6. Shoulder arthroscopic lysis of adhesions (CPT 18574):  7. Shoulder arthroscopic SLAP type 8 repair (CPT 50674)  8. Shoulder arthroscopic rotator cuff repair (rotator interval closure, supraspinatus to subscapularis, CPT 77653)    POST OPERATIVE PLAN: We will follow the arthroscopic Bankart repair and SLAP repair guidelines. We discussed with the patient's family after surgery. The patient will remain in a sling for 6 weeks. We will start PT at the 3 week wandy.     Quality of tissue: Good    Quality of the repair: Good    Subjective     Pt reports: Back from home, shoulder doing good. I can lift arm overhead and do a jump shot. Dr. Snow was happy with my progress yesterday at physicals     He was compliant with home exercise program.  Response to previous treatment: sore  Functional change: using LUE more  "at home     Pain: 4/10  Location: left shoulder      Objective   AROM pre session 130, 150 post session      Dieuodnne received therapeutic exercises to develop strength, endurance, ROM, flexibility, and posture for 5 minutes including:    Sidelying ER 2# 3 x 15   Patient education on new HEP    NT  Serratus press GTB 3 x 8  Supine wand flexion 30x  Prone extension 2# 3 x 10    Sidelying dowel flexion   Prone row 1# 3 x 10    Dieudonne received the following manual therapy techniques: Joint mobilizations and Soft tissue Mobilization were applied to the: L shoulder for 10 minutes, including:    Gr I-II oscillations  Gr III flexion 0-150 degrees  Elbow extension to 0 deg  ER arm by side 0-45 deg    Dieudonne participated in neuromuscular re-education activities to improve: Coordination, Kinesthetic, Sense, Proprioception, and Posture for 30 minutes. The following activities were included:    Foam roll serratus wall slide YTB 2 x 15  ER GTB with lift to shoulder height 2 x 10  Pushup position shoulder taps 2 x 20 B   Scaptions 2# 2 x 15    NT  Mod plank Pot stir  swiss ball 30" 3x   Body blade 30" 3x  Serratus HHR 2 x 8  CC pull down scaption 7# 2 x 20  Rouse ropes 20" 5x   Hitch hiker 20x   -lift off with pertubations 20x  Ball on wall flexion 3x to fatigue   Swiss ball iso hold with leg lowering (low abd) 3 x 10  Slot machines 30x  AAROM flexion LUE press down on RUE 2 x 10  Swiss ball roll up wall 20x     Dieudonne participated in dynamic functional therapeutic activities to improve functional performance for 8 minutes, including:    Standing UBE 8' level 7 to improve cardio endurance, functional reaching, push/pull    NT  Lateral walks GTB above knees with 4# med ball press 4 laps - mimic basketball defensive sliding    Dieudonne received cold pack for 0 minutes to to decrease circulation, pain, and swelling.      Home Exercises Provided and Patient Education Provided     Education provided:   - improve cuff stabilization "     Written Home Exercises Provided: Patient instructed to cont prior HEP.  Exercises were reviewed and Dieudonne was able to demonstrate them prior to the end of the session.  Dieudonne demonstrated good  understanding of the education provided.     See EMR under Patient Instructions for exercises provided prior visit.    Assessment     Dieudonne is progressing with strengthening in closed chain. Improved range of motion to the shoulder but remains weak to the cuff. Difficulty with lift addition to band work and with wall slides today. Notes soreness end of session    Dieudonne Is progressing well towards his goals.   Pt prognosis is Excellent.     Pt will continue to benefit from skilled outpatient physical therapy to address the deficits listed in the problem list box on initial evaluation, provide pt/family education and to maximize pt's level of independence in the home and community environment.     Pt's spiritual, cultural and educational needs considered and pt agreeable to plan of care and goals.    Anticipated barriers to physical therapy: travel    GOALS: Short Term Goals:  3 weeks(Progressing, not met)  1.Report decreased shoulder pain < / =  3/10  to increase tolerance for return to ADL out the sling  2. Increase PROM return to 90 deg flexion and 10 deg ER   3. Increased strength by 1/3 MMT grade in  L scap to increase tolerance for ADL and work activities.  4. Pt to tolerate HEP to improve ROM and independence with ADL's     Long Term Goals: 24 weeks(Progressing, not met)  1.Report decreased shoulder pain  < / =  1 /10  to increase tolerance for return to basketball  2.Increase AROM to full motion in shoulder pain free  3.Increase strength to >/= 4/5 in L shoulder stabilizers to increase tolerance for ADL and work activities.  4. Pt goal: return to basketball without L shoulder pain   5. Pt will have improved gcode of CJ (20-40% limited) on FOTO shoulder in order to demonstrate true functional improvement.      Plan     Plan of care Certification: 4/11/2023 to 10/11/2023.    Continue to progress through SLAP/[place] protocol    Devendra Plummer, PT, DPT, OCS, FAAOMPT

## 2023-06-09 ENCOUNTER — CLINICAL SUPPORT (OUTPATIENT)
Dept: REHABILITATION | Facility: HOSPITAL | Age: 18
End: 2023-06-09
Payer: COMMERCIAL

## 2023-06-09 DIAGNOSIS — M25.619 LIMITED RANGE OF MOTION (ROM) OF SHOULDER: ICD-10-CM

## 2023-06-09 DIAGNOSIS — M25.512 ACUTE PAIN OF LEFT SHOULDER: Primary | ICD-10-CM

## 2023-06-09 PROCEDURE — 97530 THERAPEUTIC ACTIVITIES: CPT | Performed by: PHYSICAL THERAPIST

## 2023-06-09 PROCEDURE — 97140 MANUAL THERAPY 1/> REGIONS: CPT | Performed by: PHYSICAL THERAPIST

## 2023-06-09 PROCEDURE — 97112 NEUROMUSCULAR REEDUCATION: CPT | Performed by: PHYSICAL THERAPIST

## 2023-06-09 NOTE — PROGRESS NOTES
Physical Therapy Daily Treatment Note     Name: Dieudonne Martin  Clinic Number: [id number]    Therapy Diagnosis:   Encounter Diagnoses   Name Primary?    Acute pain of left shoulder Yes    Limited range of motion (ROM) of shoulder      Physician: Augustus Sepulveda MD    Visit Date: 6/9/2023  Physician Orders: PT Eval and Treat   Medical Diagnosis from Referral:   M25.312 (ICD-10-CM) - Instability of left shoulder joint   M75.22 (ICD-10-CM) - Biceps tendonitis on left   Evaluation Date: 4/11/2023  Authorization Period Expiration: 12/31/2023  Plan of Care Expiration: 10/11/2023  Visit # / Visits authorized: 12/ 20    Time In: 912  Time Out: 1010  Total Billable Time: 58 minutes    Precautions: Standard    OPERATION:   left  1. Shoulder arthroscopic anterior capsulorrhaphy (CPT 78607) (complex, -22 modifier)  2. Shoulder arthroscopic anterior labral repair (CPT 75390)   3. Shoulder arthroscopic posterior labral repair, capsulorrhaphy, 7:00 position (CPT 86347)  4. Shoulder arthroscopic extensive debridement (anterior, posterior glenohumeral joint) (CPT 83254)  5. Shoulder manipulation under anesthesia (CPT 46192)  6. Shoulder arthroscopic lysis of adhesions (CPT 61258):  7. Shoulder arthroscopic SLAP type 8 repair (CPT 42226)  8. Shoulder arthroscopic rotator cuff repair (rotator interval closure, supraspinatus to subscapularis, CPT 66743)    POST OPERATIVE PLAN: We will follow the arthroscopic Bankart repair and SLAP repair guidelines. We discussed with the patient's family after surgery. The patient will remain in a sling for 6 weeks. We will start PT at the 3 week wandy.     Quality of tissue: Good    Quality of the repair: Good    Subjective     Pt reports: Shoulder feels good, I went to practice but just jogging for conditioning    He was compliant with home exercise program.  Response to previous treatment: sore  Functional change: using LUE more at home     Pain: 4/10  Location: left shoulder      Objective  "  AROM pre session 130, 150 post session    Dieudonne received therapeutic exercises to develop strength, endurance, ROM, flexibility, and posture for 0 minutes including:    Patient education on HEP and team workouts    NT  Sidelying ER 2# 3 x 15   Serratus press GTB 3 x 8  Supine wand flexion 30x  Prone extension 2# 3 x 10  Sidelying dowel flexion   Prone row 1# 3 x 10    Dieudonne received the following manual therapy techniques: Joint mobilizations and Soft tissue Mobilization were applied to the: L shoulder for 14 minutes, including:    Gr I-II oscillations  Gr III flexion 0-160 degrees  Elbow extension to 0 deg  ER arm by side 0-45 deg  STM lat and cuff   Inferior mob seated at 90/90 Gr II    Dieudonne participated in neuromuscular re-education activities to improve: Coordination, Kinesthetic, Sense, Proprioception, and Posture for 30 minutes. The following activities were included:    Pushup position shoulder taps 20" 2 x 20 B   Rouse ropes 30" 2 ways 3x each  Prone over swiss ball 3# extensions 3 x 15  ER at 90/90 in front with pertubations 20" 5x IR/ER    NT  Foam roll serratus wall slide YTB 2 x 15  ER GTB with lift to shoulder height 2 x 10  Scaptions 2# 2 x 15  Mod plank Pot stir  swiss ball 30" 3x   Body blade 30" 3x  Serratus HHR 2 x 8  CC pull down scaption 7# 2 x 20  Rouse ropes 20" 5x   Hitch hiker 20x   -lift off with pertubations 20x  Ball on wall flexion 3x to fatigue   Swiss ball iso hold with leg lowering (low abd) 3 x 10  Slot machines 30x  AAROM flexion LUE press down on RUE 2 x 10  Swiss ball roll up wall 20x     Dieudonne participated in dynamic functional therapeutic activities to improve functional performance for 14 minutes, including:    Propped sitting with light ER 0-20 deg 2 x 15  Serratus press OTB 3 x 12    NT  Standing UBE 8' level 7 to improve cardio endurance, functional reaching, push/pull  Lateral walks GTB above knees with 4# med ball press 4 laps - mimic basketball defensive " sliding    Dieudonne received cold pack for 0 minutes to to decrease circulation, pain, and swelling.      Home Exercises Provided and Patient Education Provided     Education provided:   - improve cuff stabilization     Written Home Exercises Provided: Patient instructed to cont prior HEP.  Exercises were reviewed and Dieudonne was able to demonstrate them prior to the end of the session.  Dieudonne demonstrated good  understanding of the education provided.     See EMR under Patient Instructions for exercises provided prior visit.    Assessment   Cuff specific strengthening is improving well. He began loading more through the shoulder and tolerated it well, notes difficulty with quadruped work. Limited serratus/reach strength with bands and trouble keeping elbow at 90 deg with IR/ER rhythmic stab.     Dieudonne Is progressing well towards his goals.   Pt prognosis is Excellent.     Pt will continue to benefit from skilled outpatient physical therapy to address the deficits listed in the problem list box on initial evaluation, provide pt/family education and to maximize pt's level of independence in the home and community environment.     Pt's spiritual, cultural and educational needs considered and pt agreeable to plan of care and goals.    Anticipated barriers to physical therapy: travel    GOALS: Short Term Goals:  3 weeks(Progressing, not met)  1.Report decreased shoulder pain < / =  3/10  to increase tolerance for return to ADL out the sling  2. Increase PROM return to 90 deg flexion and 10 deg ER   3. Increased strength by 1/3 MMT grade in  L scap to increase tolerance for ADL and work activities.  4. Pt to tolerate HEP to improve ROM and independence with ADL's     Long Term Goals: 24 weeks(Progressing, not met)  1.Report decreased shoulder pain  < / =  1 /10  to increase tolerance for return to basketball  2.Increase AROM to full motion in shoulder pain free  3.Increase strength to >/= 4/5 in L shoulder stabilizers  to increase tolerance for ADL and work activities.  4. Pt goal: return to basketball without L shoulder pain   5. Pt will have improved gcode of CJ (20-40% limited) on FOTO shoulder in order to demonstrate true functional improvement.     Plan     Plan of care Certification: 4/11/2023 to 10/11/2023.    Continue to progress through SLAP/Bankart protocol    Devendra Plummer, PT, DPT, OCS, FAAOMPT

## 2023-06-19 ENCOUNTER — CLINICAL SUPPORT (OUTPATIENT)
Dept: REHABILITATION | Facility: HOSPITAL | Age: 18
End: 2023-06-19
Payer: COMMERCIAL

## 2023-06-19 DIAGNOSIS — M25.619 LIMITED RANGE OF MOTION (ROM) OF SHOULDER: ICD-10-CM

## 2023-06-19 DIAGNOSIS — M25.512 ACUTE PAIN OF LEFT SHOULDER: Primary | ICD-10-CM

## 2023-06-19 PROCEDURE — 97530 THERAPEUTIC ACTIVITIES: CPT | Performed by: PHYSICAL THERAPIST

## 2023-06-19 PROCEDURE — 97112 NEUROMUSCULAR REEDUCATION: CPT | Performed by: PHYSICAL THERAPIST

## 2023-06-20 NOTE — PROGRESS NOTES
Physical Therapy Daily Treatment Note     Name: Dieudonne Martin  Clinic Number: [id number]    Therapy Diagnosis:   Encounter Diagnoses   Name Primary?    Acute pain of left shoulder Yes    Limited range of motion (ROM) of shoulder      Physician: Augustus Sepulveda MD    Visit Date: 6/19/2023  Physician Orders: PT Eval and Treat   Medical Diagnosis from Referral:   M25.312 (ICD-10-CM) - Instability of left shoulder joint   M75.22 (ICD-10-CM) - Biceps tendonitis on left   Evaluation Date: 4/11/2023  Authorization Period Expiration: 12/31/2023  Plan of Care Expiration: 10/11/2023  Visit # / Visits authorized: 14/ 20    Time In: 1500  Time Out: 1600  Total Billable Time: 60 minutes    Precautions: Standard    OPERATION:   left  1. Shoulder arthroscopic anterior capsulorrhaphy (CPT 78875) (complex, -22 modifier)  2. Shoulder arthroscopic anterior labral repair (CPT 98592)   3. Shoulder arthroscopic posterior labral repair, capsulorrhaphy, 7:00 position (CPT 76420)  4. Shoulder arthroscopic extensive debridement (anterior, posterior glenohumeral joint) (CPT 13536)  5. Shoulder manipulation under anesthesia (CPT 43730)  6. Shoulder arthroscopic lysis of adhesions (CPT 93021):  7. Shoulder arthroscopic SLAP type 8 repair (CPT 74718)  8. Shoulder arthroscopic rotator cuff repair (rotator interval closure, supraspinatus to subscapularis, CPT 99038)    POST OPERATIVE PLAN: We will follow the arthroscopic Bankart repair and SLAP repair guidelines. We discussed with the patient's family after surgery. The patient will remain in a sling for 6 weeks. We will start PT at the 3 week wandy.     Quality of tissue: Good    Quality of the repair: Good    Subjective     Pt reports: Worked out with [employer] last week, shoulder feels good. May go home again for a week    He was compliant with home exercise program.  Response to previous treatment: sore  Functional change: using LUE more at home     Pain: 4/10  Location: left shoulder   "    Objective   AROM pre session 130, 150 post session    Dieudonne received therapeutic exercises to develop strength, endurance, ROM, flexibility, and posture for 0 minutes including:    Patient education on HEP and team workouts    NT  Sidelying ER 2# 3 x 15   Serratus press GTB 3 x 8  Supine wand flexion 30x  Prone extension 2# 3 x 10  Sidelying dowel flexion   Prone row 1# 3 x 10    Dieudonne received the following manual therapy techniques: Joint mobilizations and Soft tissue Mobilization were applied to the: L shoulder for 14 minutes, including:    Gr I-II oscillations  Gr III flexion 0-160 degrees  Elbow extension to 0 deg  ER arm by side 0-45 deg  STM lat and cuff   Inferior mob seated at 90/90 Gr II    Dieudonne participated in neuromuscular re-education activities to improve: Coordination, Kinesthetic, Sense, Proprioception, and Posture for 28 minutes. The following activities were included:    Rebounder 2# 20x  Cross body rebounder 2# 20x  Med ball 4# slams at ground 30x  Cross body press 2# into wall 2 x 10    NT  Pushup position shoulder taps 20" 2 x 20 B   Rouse ropes 30" 2 ways 3x each  Prone over swiss ball 3# extensions 3 x 15  ER at 90/90 in front with pertubations 20" 5x IR/ER  Foam roll serratus wall slide YTB 2 x 15  ER GTB with lift to shoulder height 2 x 10  Scaptions 2# 2 x 15      Dieudonne participated in dynamic functional therapeutic activities to improve functional performance for 32 minutes, including:    LUE serratus shots with basketball 20x  OH rebounding catch 20x  Upside down KB 10# pertubations 3x to fatigue   Chest press 10# 3 x 8  Genie IR/ER yellow cord 2 x 15    NT  Propped sitting with light ER 0-20 deg 2 x 15  Serratus press OTB 3 x 12  Standing UBE 8' level 7 to improve cardio endurance, functional reaching, push/pull  Lateral walks GTB above knees with 4# med ball press 4 laps - mimic basketball defensive sliding    Dieudonne received cold pack for 0 minutes to to decrease " circulation, pain, and swelling.      Home Exercises Provided and Patient Education Provided     Education provided:   - improve cuff stabilization     Written Home Exercises Provided: Patient instructed to cont prior HEP.  Exercises were reviewed and Dieudonne was able to demonstrate them prior to the end of the session.  Dieudonne demonstrated good  understanding of the education provided.     See EMR under Patient Instructions for exercises provided prior visit.    Assessment     Dieudonne progressed with stabilization to the shoulder. Able to chest press 10# and shoot the basketball again today. Began light plyo at rebounder and slam at the ground. Progressing IR/ER in front at 90/90 position    Dieudonne Is progressing well towards his goals.   Pt prognosis is Excellent.     Pt will continue to benefit from skilled outpatient physical therapy to address the deficits listed in the problem list box on initial evaluation, provide pt/family education and to maximize pt's level of independence in the home and community environment.     Pt's spiritual, cultural and educational needs considered and pt agreeable to plan of care and goals.    Anticipated barriers to physical therapy: travel    GOALS: Short Term Goals:  3 weeks(Progressing, not met)  1.Report decreased shoulder pain < / =  3/10  to increase tolerance for return to ADL out the sling  2. Increase PROM return to 90 deg flexion and 10 deg ER   3. Increased strength by 1/3 MMT grade in  L scap to increase tolerance for ADL and work activities.  4. Pt to tolerate HEP to improve ROM and independence with ADL's     Long Term Goals: 24 weeks(Progressing, not met)  1.Report decreased shoulder pain  < / =  1 /10  to increase tolerance for return to basketball  2.Increase AROM to full motion in shoulder pain free  3.Increase strength to >/= 4/5 in L shoulder stabilizers to increase tolerance for ADL and work activities.  4. Pt goal: return to basketball without L shoulder  pain   5. Pt will have improved gcode of CJ (20-40% limited) on FOTO shoulder in order to demonstrate true functional improvement.     Plan     Plan of care Certification: 4/11/2023 to 10/11/2023.    Continue to progress through SLAP/Bankart protocol    Devendra Plummer, PT, DPT, OCS, FAAOMPT

## 2023-06-22 ENCOUNTER — CLINICAL SUPPORT (OUTPATIENT)
Dept: REHABILITATION | Facility: HOSPITAL | Age: 18
End: 2023-06-22
Payer: COMMERCIAL

## 2023-06-22 DIAGNOSIS — M25.512 ACUTE PAIN OF LEFT SHOULDER: Primary | ICD-10-CM

## 2023-06-22 DIAGNOSIS — M25.619 LIMITED RANGE OF MOTION (ROM) OF SHOULDER: ICD-10-CM

## 2023-06-22 PROCEDURE — 97112 NEUROMUSCULAR REEDUCATION: CPT | Performed by: PHYSICAL THERAPIST

## 2023-06-22 PROCEDURE — 97110 THERAPEUTIC EXERCISES: CPT | Performed by: PHYSICAL THERAPIST

## 2023-06-22 PROCEDURE — 97530 THERAPEUTIC ACTIVITIES: CPT | Performed by: PHYSICAL THERAPIST

## 2023-06-22 PROCEDURE — 97140 MANUAL THERAPY 1/> REGIONS: CPT | Performed by: PHYSICAL THERAPIST

## 2023-06-23 NOTE — PROGRESS NOTES
Physical Therapy Daily Treatment Note     Name: Dieudonne Martin  Clinic Number: [id number]    Therapy Diagnosis:   Encounter Diagnoses   Name Primary?    Acute pain of left shoulder Yes    Limited range of motion (ROM) of shoulder      Physician: Augustus Sepulveda MD    Visit Date: 6/22/2023  Physician Orders: PT Eval and Treat   Medical Diagnosis from Referral:   M25.312 (ICD-10-CM) - Instability of left shoulder joint   M75.22 (ICD-10-CM) - Biceps tendonitis on left   Evaluation Date: 4/11/2023  Authorization Period Expiration: 12/31/2023  Plan of Care Expiration: 10/11/2023  Visit # / Visits authorized: 15/ 20    Time In: 1059  Time Out: 1157  Total Billable Time: 58 minutes    Precautions: Standard    OPERATION:   left  1. Shoulder arthroscopic anterior capsulorrhaphy (CPT 20581) (complex, -22 modifier)  2. Shoulder arthroscopic anterior labral repair (CPT 14201)   3. Shoulder arthroscopic posterior labral repair, capsulorrhaphy, 7:00 position (CPT 94106)  4. Shoulder arthroscopic extensive debridement (anterior, posterior glenohumeral joint) (CPT 40153)  5. Shoulder manipulation under anesthesia (CPT 74523)  6. Shoulder arthroscopic lysis of adhesions (CPT 97672):  7. Shoulder arthroscopic SLAP type 8 repair (CPT 58998)  8. Shoulder arthroscopic rotator cuff repair (rotator interval closure, supraspinatus to subscapularis, CPT 78908)    POST OPERATIVE PLAN: We will follow the arthroscopic Bankart repair and SLAP repair guidelines. We discussed with the patient's family after surgery. The patient will remain in a sling for 6 weeks. We will start PT at the 3 week wandy.     Quality of tissue: Good    Quality of the repair: Good    Subjective     Pt reports: I was sore but shoulder is good. He reports shooting the other day in gym without pain    He was compliant with home exercise program.  Response to previous treatment: sore  Functional change: using LUE more at home     Pain: 4/10  Location: left shoulder   "    Objective   AROM pre session 130, 150 post session    Dieudonen received therapeutic exercises to develop strength, endurance, ROM, flexibility, and posture for 10 minutes including:    Sidelying ER 2# 3 x 15   Patient education on HEP and team workouts    NT    Serratus press GTB 3 x 8  Supine wand flexion 30x  Prone extension 2# 3 x 10  Sidelying dowel flexion   Prone row 1# 3 x 10    Dieudonne received the following manual therapy techniques: Joint mobilizations and Soft tissue Mobilization were applied to the: L shoulder for 12 minutes, including:    Gr I-II oscillations  Gr III flexion 0-160 degrees  Elbow extension to 0 deg  ER arm by side 0-45 deg  STM lat and cuff   Inferior mob seated at 90/90 Gr II    Dieudonne participated in neuromuscular re-education activities to improve: Coordination, Kinesthetic, Sense, Proprioception, and Posture for 24 minutes. The following activities were included:    Rebounder baseketball 3 planes 20x  Body blade over 90/90 5x to fatigue  Quadruped 4# med ball on wall 3x to fatigue   Prone T/Y 1# 2 x 15  Serratus HHR 30x  Serratus foam roll up wall 30x    NT  Cross body rebounder 20x  Med ball 4# slams at ground 30x  Cross body press 2# into wall 2 x 10  Pushup position shoulder taps 20" 2 x 20 B   Rouse ropes 30" 2 ways 3x each  Prone over swiss ball 3# extensions 3 x 15  ER at 90/90 in front with pertubations 20" 5x IR/ER  Foam roll serratus wall slide YTB 2 x 15  ER GTB with lift to shoulder height 2 x 10  Scaptions 2# 2 x 15      Dieudonne participated in dynamic functional therapeutic activities to improve functional performance for 12 minutes, including:    LUE serratus shots with basketball 20x  Standing UBE 8' level 7 to improve cardio endurance, functional reaching, push/pull      NT  OH rebounding catch 20x  Upside down KB 10# pertubations 3x to fatigue   Chest press 10# 3 x 8  Genie IR/ER yellow cord 2 x 15  Propped sitting with light ER 0-20 deg 2 x 15  Serratus press " OTB 3 x 12    Lateral walks GTB above knees with 4# med ball press 4 laps - mimic basketball defensive sliding    Dieudonne received cold pack for 0 minutes to to decrease circulation, pain, and swelling.      Home Exercises Provided and Patient Education Provided     Education provided:   - improve cuff stabilization     Written Home Exercises Provided: Patient instructed to cont prior HEP.  Exercises were reviewed and Dieudonne was able to demonstrate them prior to the end of the session.  Dieudonne demonstrated good  understanding of the education provided.     See EMR under Patient Instructions for exercises provided prior visit.    Assessment     Dieudonne continues to progress well with CKC loading through the LUE. Active flexion improved to 165 deg end of session. Focused on serratus activation with ball on wall, HHR, and foam roll work and mid/low trap strengthening today.     Dieudonne Is progressing well towards his goals.   Pt prognosis is Excellent.     Pt will continue to benefit from skilled outpatient physical therapy to address the deficits listed in the problem list box on initial evaluation, provide pt/family education and to maximize pt's level of independence in the home and community environment.     Pt's spiritual, cultural and educational needs considered and pt agreeable to plan of care and goals.    Anticipated barriers to physical therapy: travel    GOALS: Short Term Goals:  3 weeks(Progressing, not met)  1.Report decreased shoulder pain < / =  3/10  to increase tolerance for return to ADL out the sling  2. Increase PROM return to 90 deg flexion and 10 deg ER   3. Increased strength by 1/3 MMT grade in  L scap to increase tolerance for ADL and work activities.  4. Pt to tolerate HEP to improve ROM and independence with ADL's     Long Term Goals: 24 weeks(Progressing, not met)  1.Report decreased shoulder pain  < / =  1 /10  to increase tolerance for return to basketball  2.Increase AROM to full  motion in shoulder pain free  3.Increase strength to >/= 4/5 in L shoulder stabilizers to increase tolerance for ADL and work activities.  4. Pt goal: return to basketball without L shoulder pain   5. Pt will have improved gcode of CJ (20-40% limited) on FOTO shoulder in order to demonstrate true functional improvement.     Plan     Plan of care Certification: 4/11/2023 to 10/11/2023.    Continue to progress through SLAP/Bankart protocol    Devendra Plummer, PT, DPT, OCS, FAAOMPT

## 2023-06-27 ENCOUNTER — CLINICAL SUPPORT (OUTPATIENT)
Dept: REHABILITATION | Facility: HOSPITAL | Age: 18
End: 2023-06-27
Payer: COMMERCIAL

## 2023-06-27 DIAGNOSIS — M25.512 ACUTE PAIN OF LEFT SHOULDER: Primary | ICD-10-CM

## 2023-06-27 DIAGNOSIS — M25.619 LIMITED RANGE OF MOTION (ROM) OF SHOULDER: ICD-10-CM

## 2023-06-27 PROCEDURE — 97140 MANUAL THERAPY 1/> REGIONS: CPT | Performed by: PHYSICAL THERAPIST

## 2023-06-27 PROCEDURE — 97530 THERAPEUTIC ACTIVITIES: CPT | Performed by: PHYSICAL THERAPIST

## 2023-06-27 PROCEDURE — 97112 NEUROMUSCULAR REEDUCATION: CPT | Performed by: PHYSICAL THERAPIST

## 2023-06-27 NOTE — PROGRESS NOTES
Physical Therapy Daily Treatment Note     Name: Dieudonne Martin  [facility] Number: [id number]    Therapy Diagnosis:   Encounter Diagnoses   Name Primary?    Acute pain of left shoulder Yes    Limited range of motion (ROM) of shoulder        Physician: Augustus Sepulveda MD    Visit Date: 6/27/2023  Physician Orders: PT Eval and Treat   Medical Diagnosis from Referral:   M25.312 (ICD-10-CM) - Instability of left shoulder joint   M75.22 (ICD-10-CM) - Biceps tendonitis on left   Evaluation Date: 4/11/2023  Authorization Period Expiration: 12/31/2023  Plan of Care Expiration: 10/11/2023  Visit # / Visits authorized: 16/ 20    Time In: 0900  Time Out: 1002  Total Billable Time: 62 minutes    Precautions: Standard    OPERATION:   left  1. Shoulder arthroscopic anterior capsulorrhaphy (CPT 37030) (complex, -22 modifier)  2. Shoulder arthroscopic anterior labral repair (CPT 36775)   3. Shoulder arthroscopic posterior labral repair, capsulorrhaphy, 7:00 position (CPT 37756)  4. Shoulder arthroscopic extensive debridement (anterior, posterior glenohumeral joint) (CPT 04034)  5. Shoulder manipulation under anesthesia (CPT 03439)  6. Shoulder arthroscopic lysis of adhesions (CPT 39515):  7. Shoulder arthroscopic SLAP type 8 repair (CPT 49275)  8. Shoulder arthroscopic rotator cuff repair (rotator interval closure, supraspinatus to subscapularis, CPT 50956)    POST OPERATIVE PLAN: We will follow the arthroscopic Bankart repair and SLAP repair guidelines. We discussed with the patient's family after surgery. The patient will remain in a sling for 6 weeks. We will start PT at the 3 week wandy.     Quality of tissue: Good    Quality of the repair: Good    Subjective     Pt reports: I worked out this morning, benched the bar. I was rebounding and shooting a lot.     He was compliant with home exercise program.  Response to previous treatment: sore  Functional change: using LUE more at home     Pain: 4/10  Location: left shoulder       Objective   AROM pre session 130, 150 post session    Dieudonne received therapeutic exercises to develop strength, endurance, ROM, flexibility, and posture for 7 minutes including:    Sidelying ER pertubations 3 x 15   Patient education on HEP and team workouts    NT  Serratus press GTB 3 x 8  Supine wand flexion 30x  Prone extension 2# 3 x 10  Sidelying dowel flexion   Prone row 1# 3 x 10    Dieudonne received the following manual therapy techniques: Joint mobilizations and Soft tissue Mobilization were applied to the: L shoulder for 12 minutes, including:    Gr I-II oscillations  Gr III flexion 0-160 degrees  Elbow extension to 0 deg  ER arm by side 0-45 deg  STM lat and cuff   Inferior mob seated at 90/90 Gr II    Dieudonne participated in neuromuscular re-education activities to improve: Coordination, Kinesthetic, Sense, Proprioception, and Posture for 29 minutes. The following activities were included:    Body blade over 90/90 5x to fatigue  Prone I/T/Y 2# 3 x 8 iso hold opposite side   Serratus HHR 30x  Swiss ball pot stir ground 3x fatigue   ER arm in front OTB 2 x 15    NT  Rebounder baseketball 3 planes 20x  Quadruped 4# med ball on wall 3x to fatigue   Serratus foam roll up wall 30x  Cross body rebounder 20x    Cross body press 2# into wall 2 x 10    Dieudonne participated in dynamic functional therapeutic activities to improve functional performance for 12 minutes, including:    Serratus punch orange cord 3 x 15  Med ball 6# slams at ground 30x  Y with lift off 2 x 15    NT  LUE serratus shots with basketball 20x  Standing UBE 8' level 7 to improve cardio endurance, functional reaching, push/pull  OH rebounding catch 20x  Upside down KB 10# pertubations 3x to fatigue   Chest press 10# 3 x 8  Genie IR/ER yellow cord 2 x 15  Propped sitting with light ER 0-20 deg 2 x 15  Serratus press OTB 3 x 12  Lateral walks GTB above knees with 4# med ball press 4 laps - mimic basketball defensive sliding    Dieudonne  received cold pack for 0 minutes to to decrease circulation, pain, and swelling.      Home Exercises Provided and Patient Education Provided     Education provided:   - improve cuff stabilization     Written Home Exercises Provided: Patient instructed to cont prior HEP.  Exercises were reviewed and Dieudonne was able to demonstrate them prior to the end of the session.  Dieudonne demonstrated good  understanding of the education provided.     See EMR under Patient Instructions for exercises provided prior visit.    Assessment     Dieudonne progressed with strengthening, limited endurance with body blade and cuff in front. Notes not doing his cuff work outside of the clinic. Continue to stress cuff strength to get his end range actively.     Dieudonne Is progressing well towards his goals.   Pt prognosis is Excellent.     Pt will continue to benefit from skilled outpatient physical therapy to address the deficits listed in the problem list box on initial evaluation, provide pt/family education and to maximize pt's level of independence in the home and community environment.     Pt's spiritual, cultural and educational needs considered and pt agreeable to plan of care and goals.    Anticipated barriers to physical therapy: travel    GOALS: Short Term Goals:  3 weeks(Progressing, not met)  1.Report decreased shoulder pain < / =  3/10  to increase tolerance for return to ADL out the sling  2. Increase PROM return to 90 deg flexion and 10 deg ER   3. Increased strength by 1/3 MMT grade in  L scap to increase tolerance for ADL and work activities.  4. Pt to tolerate HEP to improve ROM and independence with ADL's     Long Term Goals: 24 weeks(Progressing, not met)  1.Report decreased shoulder pain  < / =  1 /10  to increase tolerance for return to basketball  2.Increase AROM to full motion in shoulder pain free  3.Increase strength to >/= 4/5 in L shoulder stabilizers to increase tolerance for ADL and work activities.  4. Pt goal:  return to basketball without L shoulder pain   5. Pt will have improved gcode of CJ (20-40% limited) on FOTO shoulder in order to demonstrate true functional improvement.     Plan     Plan of care Certification: 4/11/2023 to 10/11/2023.    Continue to progress through SLAP/Bankart protocol    Devendra Plummer, PT, DPT, OCS, FAAOMPT

## 2023-06-30 ENCOUNTER — CLINICAL SUPPORT (OUTPATIENT)
Dept: REHABILITATION | Facility: HOSPITAL | Age: 18
End: 2023-06-30
Payer: COMMERCIAL

## 2023-06-30 DIAGNOSIS — M25.512 ACUTE PAIN OF LEFT SHOULDER: Primary | ICD-10-CM

## 2023-06-30 DIAGNOSIS — M25.619 LIMITED RANGE OF MOTION (ROM) OF SHOULDER: ICD-10-CM

## 2023-06-30 PROCEDURE — 97112 NEUROMUSCULAR REEDUCATION: CPT

## 2023-06-30 PROCEDURE — 97140 MANUAL THERAPY 1/> REGIONS: CPT

## 2023-06-30 PROCEDURE — 97530 THERAPEUTIC ACTIVITIES: CPT

## 2023-06-30 NOTE — PROGRESS NOTES
Physical Therapy Daily Treatment Note     Name: Dieudonne Martin  [facility] [place] Number: [id number]    Therapy Diagnosis:   Encounter Diagnoses   Name Primary?    Acute pain of left shoulder Yes    Limited range of motion (ROM) of shoulder          Physician: Augustus Sepulveda MD    Visit Date: 6/30/2023  Physician Orders: PT Eval and Treat   Medical Diagnosis from Referral:   M25.312 (ICD-10-CM) - Instability of left shoulder joint   M75.22 (ICD-10-CM) - Biceps tendonitis on left   Evaluation Date: 4/11/2023  Authorization Period Expiration: 12/31/2023  Plan of Care Expiration: 10/11/2023  Visit # / Visits authorized: 17/ 20    Time In: 9:00 AM  Time Out: 10:00 AM  Total Billable Time: 60 minutes    Precautions: Standard    OPERATION: 3/21/23 14 weeks 3 days POD  left  1. Shoulder arthroscopic anterior capsulorrhaphy (CPT 04077) (complex, -22 modifier)  2. Shoulder arthroscopic anterior labral repair (CPT 27989)   3. Shoulder arthroscopic posterior labral repair, capsulorrhaphy, 7:00 position (CPT 75877)  4. Shoulder arthroscopic extensive debridement (anterior, posterior glenohumeral joint) (CPT 86817)  5. Shoulder manipulation under anesthesia (CPT 86095)  6. Shoulder arthroscopic lysis of adhesions (CPT 24755):  7. Shoulder arthroscopic SLAP type 8 repair (CPT 50126)  8. Shoulder arthroscopic rotator cuff repair (rotator interval closure, supraspinatus to subscapularis, CPT 96132)    POST OPERATIVE PLAN: We will follow the arthroscopic Bankart repair and SLAP repair guidelines. We discussed with the patient's family after surgery. The patient will remain in a sling for 6 weeks. We will start PT at the 3 week wandy.     Quality of tissue: Good    Quality of the repair: Good    Subjective     Pt reports: doing alright I still cant reach all the overhead and feel stiff    He was compliant with home exercise program.  Response to previous treatment: sore  Functional change: using LUE more at home     Pain: 4/10  Location:  left shoulder      Objective   AROM pre session 130, 150 post session    Dieudonne received therapeutic exercises to develop strength, endurance, ROM, flexibility, and posture for 6 minutes including:  Sidelying ER pertubations 3 x 15   Patient education on HEP and team workouts    NT  Serratus press GTB 3 x 8  Supine wand flexion 30x  Prone extension 2# 3 x 10  Sidelying dowel flexion   Prone row 1# 3 x 10    Dieudonne received the following manual therapy techniques: Joint mobilizations and Soft tissue Mobilization were applied to the: L shoulder for 12 minutes, including:    Gr I-II oscillations  Gr III flexion 0-160 degrees  Elbow extension to 0 deg  ER arm by side 0-45 deg  STM lat and cuff   Inferior mob seated at 90/90 Gr II    Dieudonne participated in neuromuscular re-education activities to improve: Coordination, Kinesthetic, Sense, Proprioception, and Posture for 30 minutes. The following activities were included:  PT resisted ER/IR at 90/90   PT resisted PNF RS 2x10  Supine ER @ 90/90 3x10 RTB  Quadruped wall ball: up/down, side/side, CW,CCW  OTB ER at 90 deg flexion-> flexion 3x15    Body blade over 90/90 5x to fatigue  Prone I/T/Y 2# 3 x 8 iso hold opposite side   Serratus HHR 30x  Swiss ball pot stir ground 3x fatigue     NT  Rebounder baseketball 3 planes 20x  Quadruped 4# med ball on wall 3x to fatigue   Serratus foam roll up wall 30x  Cross body rebounder 20x  Cross body press 2# into wall 2 x 10    Dieudonne participated in dynamic functional therapeutic activities to improve functional performance for 12 minutes, including:    Serratus punch orange cord 3 x 15  Med ball 6# slams at ground 30x  Y with lift off 2 x 15    NT  LUE serratus shots with basketball 20x  Standing UBE 8' level 7 to improve cardio endurance, functional reaching, push/pull  OH rebounding catch 20x  Upside down KB 10# pertubations 3x to fatigue   Chest press 10# 3 x 8  Genie IR/ER yellow cord 2 x 15  Propped sitting with light ER  0-20 deg 2 x 15  Serratus press OTB 3 x 12  Lateral walks GTB above knees with 4# med ball press 4 laps - mimic basketball defensive sliding    Dieudonne received cold pack for 0 minutes to to decrease circulation, pain, and swelling.      Home Exercises Provided and Patient Education Provided     Education provided:   - improve cuff stabilization     Written Home Exercises Provided: Patient instructed to cont prior HEP.  Exercises were reviewed and Dieudonne was able to demonstrate them prior to the end of the session.  Dieudonne demonstrated good  understanding of the education provided.     See EMR under Patient Instructions for exercises provided prior visit.    Assessment     Dieudonne progressed with strengthening, limited endurance with body blade and cuff in front. Notes not doing his cuff work outside of the clinic. Continue to stress cuff strength to get his end range actively.     Dieudonne Is progressing well towards his goals.   Pt prognosis is Excellent.     Pt will continue to benefit from skilled outpatient physical therapy to address the deficits listed in the problem list box on initial evaluation, provide pt/family education and to maximize pt's level of independence in the home and community environment.     Pt's spiritual, cultural and educational needs considered and pt agreeable to plan of care and goals.    Anticipated barriers to physical therapy: travel    GOALS: Short Term Goals:  3 weeks(Progressing, not met)  1.Report decreased shoulder pain < / =  3/10  to increase tolerance for return to ADL out the sling  2. Increase PROM return to 90 deg flexion and 10 deg ER   3. Increased strength by 1/3 MMT grade in  L scap to increase tolerance for ADL and work activities.  4. Pt to tolerate HEP to improve ROM and independence with ADL's     Long Term Goals: 24 weeks(Progressing, not met)  1.Report decreased shoulder pain  < / =  1 /10  to increase tolerance for return to basketball  2.Increase AROM to  full motion in shoulder pain free  3.Increase strength to >/= 4/5 in L shoulder stabilizers to increase tolerance for ADL and work activities.  4. Pt goal: return to basketball without L shoulder pain   5. Pt will have improved gcode of CJ (20-40% limited) on FOTO shoulder in order to demonstrate true functional improvement.     Plan     Plan of care Certification: 4/11/2023 to 10/11/2023.    Continue to progress through SLAP/Bankart protocol    JUAN FRANCISCO MORGAN, PT, DPT, SCS

## 2023-07-17 NOTE — H&P (VIEW-ONLY)
Dieudonne Martin  is here for a completion of his perioperative paperwork. he  Is scheduled to undergo        left  a. Shoulder arthroscopic anterior labral repair  b. Shoulder arthroscopic anterior capsulorrhaphy  c. Shoulder arthroscopic posterior labral repair  d. Shoulder arthroscopic posterior capsulorrhaphy  e. Shoulder arthroscopic partial synovectomy/debridement  f.  Shoulder arthroscopic possible biceps tenodesis (vs. open subpect tenodesis)  g. Shoulder arthroscopic possible SLAP repair on 3/21/23.      He is a healthy individual and does not need clearance for this procedure.     PAST MEDICAL HISTORY: History reviewed. No pertinent past medical history.  PAST SURGICAL HISTORY: History reviewed. No pertinent surgical history.  FAMILY HISTORY:   Family History   Problem Relation Age of Onset    No Known Problems Mother     No Known Problems Father      SOCIAL HISTORY:   Social History     Socioeconomic History    Marital status: Single   Tobacco Use    Smoking status: Never    Smokeless tobacco: Never   Substance and Sexual Activity    Alcohol use: Yes    Sexual activity: Yes       MEDICATIONS:   Current Outpatient Medications:     meloxicam (MOBIC) 15 MG tablet, Take 1 tablet (15 mg total) by mouth once daily., Disp: 30 tablet, Rfl: 0    aspirin (ECOTRIN) 81 MG EC tablet, Take 1 tablet (81 mg total) by mouth once daily. For 4 weeks starting the day after surgery., Disp: 28 tablet, Rfl: 0    oxyCODONE-acetaminophen (PERCOCET)  mg per tablet, Take 1 tablet by mouth every 4-6 hours as needed for pain. Take stool softener with this medication., Disp: 21 tablet, Rfl: 0    promethazine (PHENERGAN) 25 MG tablet, Take 1 tablet (25 mg total) by mouth every 6 (six) hours as needed for Nausea., Disp: 8 tablet, Rfl: 0    traMADoL (ULTRAM) 50 mg tablet, Take 1-2 tablets ( mg total) by mouth every 6 (six) hours as needed for Pain., Disp: 21 tablet, Rfl: 0  ALLERGIES: Review of patient's allergies  "indicates:  No Known Allergies    VITAL SIGNS: /74   Pulse 66   Ht 6' 8" (2.032 m)   Wt 106.1 kg (234 lb)   BMI 25.71 kg/m²      Risks, indications and benefits of the surgical procedure were discussed with the patient. All questions with regard to surgery, rehab, expected return to functional activities, activities of daily living and recreational endeavors were answered to his satisfaction.    It was explained to the patient that there may be an increase in surgical risks if the patient has certain co-morbidities such as but not limited to: Obesity, Cardiovascular issues (CHF, CAD, Arrhythmias), chronic pulmonary issues, previous or current neurovascular/neurological issues, previous strokes, diabetes mellitus, previous wound healing issues, previous wound or skin infections, PVD, clotting disorders, if the patient uses chronic steroids, if the patient takes or has immune compromising medications or diseases, or has previously or currently used tobacco products.     The patient verbalized that he/she does not have any additional clotting, bleeding, or blood disorders, other than what is list in her chart on today's review.     Then a brief history and physical exam were performed.    Review of Systems   Constitution: Negative. Negative for chills, fever and night sweats.   HENT: Negative for congestion and headaches.    Eyes: Negative for blurred vision, left vision loss and right vision loss.   Cardiovascular: Negative for chest pain and syncope.   Respiratory: Negative for cough and shortness of breath.    Endocrine: Negative for polydipsia, polyphagia and polyuria.   Hematologic/Lymphatic: Negative for bleeding problem. Does not bruise/bleed easily.   Skin: Negative for dry skin, itching and rash.   Musculoskeletal: Negative for falls and muscle weakness.   Gastrointestinal: Negative for abdominal pain and bowel incontinence.   Genitourinary: Negative for bladder incontinence and nocturia. "   Neurological: Negative for disturbances in coordination, loss of balance and seizures.   Psychiatric/Behavioral: Negative for depression. The patient does not have insomnia.    Allergic/Immunologic: Negative for hives and persistent infections.     PHYSICAL EXAM:  GEN: A&Ox3, WD WN NAD  HEENT: WNL  CHEST: CTAB, no W/R/R  HEART: RRR, no M/R/G  ABD: Soft, NT ND, BS x4 QUADS  MS; See Epic  NEURO: CN II-XII intact       The surgical consent was then reviewed with the patient, who agreed with all the contents of the consent form and it was signed. he was then given the surgery packet to bring with him to surgery center for the anesthesia portion of his perioperative paperwork (if needed)   For all physicians except for Dr. Forman, we will email and possibly fax the consent forms and booking sheets to [facility].    The patient was given the opportunity to ask questions about the surgical plan and consent form, and once no other questions were asked, I proceeded with the pre-op appointment.    PHYSICAL THERAPY:  He was also instructed regarding physical therapy and will begin on  3 weeks post-op. He was given a copy of the original prescription to schedule. Another copy of this prescription was also faxed to [facility].    POST OP CARE:instructions were reviewed including care of the wound and dressing after surgery and when he can shower. Patient was told not sleep or lay on there surgical extremity following surgery as this could cause repair damage, tissue damage, or nerve injury.    An extensive amount of time was spent on discussion of the following information based on what type of surgery the patient was having. Patient expressed understanding of the material below:    Shoulder surgery or upper extremity surgery requiring post-op sling:  It was explained to the patient that they should remove their arm from the sling approximately 6 times per day to do full elbow ROM (flexion and extension) and  full supination and pronation of the elbow for approximately 5 minutes at a time to help prevent elbow stiffness, nerve pain or problems, or nerve injury. They were told to contact us if they begin having numbness and tingling of there surgical extremity that persists longer then 1 day without relief.     Extremity surgery requiring a splint:   It was explained to patient on how to properly elevated position there extremity to prevent pressure ulcers from occurring. I made sure that the patient understood that that surgical site may be numb following surgery and prevent them from feeling pressure pain that they would normal feel if a pressure injury was occurring. Pressure ulcers and there causes were discussed with the patient today.     Post-operative splint:  It was explain to the patient that they can contact us at anytime if they feel that there is a problem with their splint or under their splint that needs evaluation. If there is concern, questions, or discomfort with the splint then they can present to either our clinic or the [facility] ED for removal, evaluation, and replacement of the splint.    CRUTCHES OR WALKER: It was explained to the patient that if they are having a lower extremity surgery that they will require either a walker or crutches to ambulate safely with after surgery. It was explained that a cane or other assistive devices are not sufficient to safely ambulate with after surgery. I explained to the patient that I will place an order for them to receive either crutches or a walker after surgery to go home with. It was explained that if they have crutches or a walker at home already, that they are REQUIRED to bring them to the hospital on the day of surgery. It was explained that if they do not have them at the hospital on the day of surgery that they WILL be provided a new pair or crutches or a walker to go home with to ensure ambulation will be safe if the patient needs to stop  somewhere on the way home.      PAIN MANAGEMENT: Dieudonne Martin was also given a pain management regime, which includes the option of getting a TENS unit given to him by [facility] (if patient chooses) along with the education required for its use. He was also instructed regarding the Polar ice unit or gel ice packs (chosen by patient) that will be in place after surgery and his postoperative pain medications.     Patient understands that Polar Ice machine has to be bought today if they want it. It cannot be bought on day of surgery at surgery center.     PAIN MEDICATION:  Percocet 10/325mg 1 po q 4-6 hours prn pain  Ultram 50 mg Take 1-2 p.o. q.6 hours p.r.n. breakthrough pain,   Phenergan 25 mg one p.o. q.6 hours p.r.n. nausea and vomiting.    DVT prophylaxis was discussed with the patient today including risk factors for developing DVTs and history of DVTs. The patient was asked if any specific recommendations were given from the doctor/s that did pre-operative surgical clearance. The patient was then given an education sheet about DVTs and PE with warning signs and symptoms of both and steps to take if they suspect either of these.    This along with the Modified Caprini risk assessment model for VTE in general surgical patients was used to determine the patient's DVT risk.     From: Frances MK, Nelosn DA, Jacinta SM, et al. Prevention of VTE in nonorthopedic surgical patients: antithrombotic therapy and prevention of thrombosis, 9th ed: American College [employer] evidence-based clinical practical guidelines. Chest 2012; 141:e227S. Copyright © 2012. Reproduced with permission from the American College of Chest Physicians.    The below listed DVT prophylaxis regimen along with bilateral ELMO compression stockings will be used post-op. Length of treatment has been determined to be 10-42 days post-op by the above noted Caprini assessment model.     The patient was instructed to buy and take:  Aspirin 81mg  QD x 4 weeks for DVT prophylaxis starting on the morning after surgery.  Patient will also use bilateral TEDs on lower extremities, SCDs during surgery, and early ambulation post-op. If the patient was previously taking 81mg baby aspirin, they were told to not take it starting 5 days prior to surgery and to restart the 81mg aspirin after surgery.       Patient was also told to buy over the counter Prilosec medication if needed and take it once daily for GI protection as long as they are taking NSAIDs or Aspirin.   Patient denies history of seizures.      The patient was told that narcotic pain medications may make them drowsy and instructions were given to not sign legal documents, drive or operate heavy machinery, cars, or equipment while under the influence of narcotic medications. The patient was told and understands that narcotic pain medications should only be used as needed to control pain and that other options of pain control include TENs unit and ice packs/unit.     As there were no other questions to be asked, he was given my business card along with Tiffani Snow MD business card if he has any questions or concerns prior to surgery or in the postop period.      No

## 2023-07-19 ENCOUNTER — CLINICAL SUPPORT (OUTPATIENT)
Dept: REHABILITATION | Facility: HOSPITAL | Age: 18
End: 2023-07-19
Payer: COMMERCIAL

## 2023-07-19 DIAGNOSIS — M25.512 ACUTE PAIN OF LEFT SHOULDER: Primary | ICD-10-CM

## 2023-07-19 DIAGNOSIS — M25.619 LIMITED RANGE OF MOTION (ROM) OF SHOULDER: ICD-10-CM

## 2023-07-19 PROCEDURE — 97140 MANUAL THERAPY 1/> REGIONS: CPT

## 2023-07-19 PROCEDURE — 97112 NEUROMUSCULAR REEDUCATION: CPT

## 2023-07-19 PROCEDURE — 97110 THERAPEUTIC EXERCISES: CPT

## 2023-07-19 NOTE — PROGRESS NOTES
Physical Therapy Daily Treatment Note     Name: Dieudonne Martin  [facility] Number: [id number]    Therapy Diagnosis:   Encounter Diagnoses   Name Primary?    Acute pain of left shoulder Yes    Limited range of motion (ROM) of shoulder          Physician: Augustus Sepulveda MD    Visit Date: 7/19/2023  Physician Orders: PT Eval and Treat   Medical Diagnosis from Referral:   M25.312 (ICD-10-CM) - Instability of left shoulder joint   M75.22 (ICD-10-CM) - Biceps tendonitis on left   Evaluation Date: 4/11/2023  Authorization Period Expiration: 12/31/2023  Plan of Care Expiration: 10/11/2023  Visit # / Visits authorized: 18/ 20    Time In: 11:00 AM   Time Out: 12:00 PM  Total Billable Time: 60 minutes    Precautions: Standard    OPERATION: 3/21/23 14 weeks 3 days POD  left  1. Shoulder arthroscopic anterior capsulorrhaphy (CPT 95049) (complex, -22 modifier)  2. Shoulder arthroscopic anterior labral repair (CPT 19295)   3. Shoulder arthroscopic posterior labral repair, capsulorrhaphy, 7:00 position (CPT 04839)  4. Shoulder arthroscopic extensive debridement (anterior, posterior glenohumeral joint) (CPT 71567)  5. Shoulder manipulation under anesthesia (CPT 65921)  6. Shoulder arthroscopic lysis of adhesions (CPT 08167):  7. Shoulder arthroscopic SLAP type 8 repair (CPT 70398)  8. Shoulder arthroscopic rotator cuff repair (rotator interval closure, supraspinatus to subscapularis, CPT 70681)    POST OPERATIVE PLAN: We will follow the arthroscopic Bankart repair and SLAP repair guidelines. We discussed with the patient's family after surgery. The patient will remain in a sling for 6 weeks. We will start PT at the 3 week wandy.     Quality of tissue: Good    Quality of the repair: Good    Subjective     Pt reports: doing alright I still cant reach all the overhead and feel stiff    He was compliant with home exercise program.  Response to previous treatment: sore  Functional change: using LUE more at home     Pain: 4/10  Location:  left shoulder      Objective   AROM pre session 130, 150 post session    Dieudonne received therapeutic exercises to develop strength, endurance, ROM, flexibility, and posture for 12 minutes including:  Sidelying ER pertubations 3 x 15   Patient education on HEP and team workouts  Sidelying Cross Body Stretch 10 x :10     NT  Serratus press GTB 3 x 8  Supine wand flexion 30x  Prone extension 2# 3 x 10  Sidelying dowel flexion   Prone row 1# 3 x 10    Dieudonne received the following manual therapy techniques: Joint mobilizations and Soft tissue Mobilization were applied to the: L shoulder for 24 minutes, including:    Gr I-II oscillations  Gr III flexion 0-160 degrees  Elbow extension to 0 deg  ER arm by side 0-45 deg  STM lat and cuff   Inferior mob seated at 90/90 Gr II  Manual Scap pin + cross body stretch     Dieudonne participated in neuromuscular re-education activities to improve: Coordination, Kinesthetic, Sense, Proprioception, and Posture for 24 minutes. The following activities were included:  PT resisted ER/IR at 90/90   PT resisted PNF RS 2x10  Supine ER @ 90/90 3x10 RTB  Quadruped wall ball: up/down, side/side, CW,CCW    Band ER @ side 3 x 30 GTB   Band IR @ side 3 x 30 GTB     Body blade over 90/90 5x to fatigue  Prone I/T/Y 2# 3 x 8 iso hold opposite side   Serratus HHR 30x  Swiss ball pot stir ground 3x fatigue     Standing Handcuffs with perturbations x 30     NT  Rebounder baseketball 3 planes 20x  Quadruped 4# med ball on wall 3x to fatigue   Serratus foam roll up wall 30x  Cross body rebounder 20x  OTB ER at 90 deg flexion-> flexion 3x15  Cross body press 2# into wall 2 x 10    Dieudonne participated in dynamic functional therapeutic activities to improve functional performance for 12 minutes, including:        NT  LUE serratus shots with basketball 20x  Standing UBE 8' level 7 to improve cardio endurance, functional reaching, push/pull  OH rebounding catch 20x  Upside down KB 10# pertubations 3x to  fatigue   Chest press 10# 3 x 8  Genie IR/ER yellow cord 2 x 15  Propped sitting with light ER 0-20 deg 2 x 15  Serratus press OTB 3 x 12  Lateral walks GTB above knees with 4# med ball press 4 laps - mimic basketball defensive sliding  Serratus punch orange cord 3 x 15  Med ball 6# slams at ground 30x  Y with lift off 2 x 15    Dieudonne received cold pack for 0 minutes to to decrease circulation, pain, and swelling.      Home Exercises Provided and Patient Education Provided     Education provided:   - improve cuff stabilization     Written Home Exercises Provided: Patient instructed to cont prior HEP.  Exercises were reviewed and Dieudonne was able to demonstrate them prior to the end of the session.  Dieudonne demonstrated good  understanding of the education provided.     See EMR under Patient Instructions for exercises provided prior visit.    Assessment     Dieudonne with slow progression in AFE and ER at side and 90/90 but still stiffness in all directions. Extensive education provided to continue stretching and cuff activation exercises ahead of f/u w/Dr. Snow next Monday.     Dieudonne Is progressing well towards his goals.   Pt prognosis is Excellent.     Pt will continue to benefit from skilled outpatient physical therapy to address the deficits listed in the problem list box on initial evaluation, provide pt/family education and to maximize pt's level of independence in the home and community environment.     Pt's spiritual, cultural and educational needs considered and pt agreeable to plan of care and goals.    Anticipated barriers to physical therapy: travel    GOALS: Short Term Goals:  3 weeks(Progressing, not met)  1.Report decreased shoulder pain < / =  3/10  to increase tolerance for return to ADL out the sling  2. Increase PROM return to 90 deg flexion and 10 deg ER   3. Increased strength by 1/3 MMT grade in  L scap to increase tolerance for ADL and work activities.  4. Pt to tolerate HEP to improve ROM  and independence with ADL's     Long Term Goals: 24 weeks(Progressing, not met)  1.Report decreased shoulder pain  < / =  1 /10  to increase tolerance for return to basketball  2.Increase AROM to full motion in shoulder pain free  3.Increase strength to >/= 4/5 in L shoulder stabilizers to increase tolerance for ADL and work activities.  4. Pt goal: return to basketball without L shoulder pain   5. Pt will have improved gcode of CJ (20-40% limited) on FOTO shoulder in order to demonstrate true functional improvement.     Plan     Plan of care Certification: 4/11/2023 to 10/11/2023.    Continue to progress through SLAP/Bankart protocol    Johnathan Marsh, PT, DPT    Randolph Seaman, PT, DPT, SCS, OCS

## 2023-07-21 ENCOUNTER — CLINICAL SUPPORT (OUTPATIENT)
Dept: REHABILITATION | Facility: HOSPITAL | Age: 18
End: 2023-07-21
Payer: COMMERCIAL

## 2023-07-21 DIAGNOSIS — M25.619 LIMITED RANGE OF MOTION (ROM) OF SHOULDER: ICD-10-CM

## 2023-07-21 DIAGNOSIS — M25.512 ACUTE PAIN OF LEFT SHOULDER: Primary | ICD-10-CM

## 2023-07-21 PROCEDURE — 97110 THERAPEUTIC EXERCISES: CPT

## 2023-07-21 PROCEDURE — 97140 MANUAL THERAPY 1/> REGIONS: CPT

## 2023-07-21 PROCEDURE — 97112 NEUROMUSCULAR REEDUCATION: CPT

## 2023-07-21 NOTE — PROGRESS NOTES
Physical Therapy Daily Treatment Note     Name: Dieudonne Martin  Clinic Number: [id number]    Therapy Diagnosis:   Encounter Diagnoses   Name Primary?    Acute pain of left shoulder Yes    Limited range of motion (ROM) of shoulder            Physician: Augustus Sepulveda MD    Visit Date: 7/21/2023  Physician Orders: PT Eval and Treat   Medical Diagnosis from Referral:   M25.312 (ICD-10-CM) - Instability of left shoulder joint   M75.22 (ICD-10-CM) - Biceps tendonitis on left   Evaluation Date: 4/11/2023  Authorization Period Expiration: 12/31/2023  Plan of Care Expiration: 10/11/2023  Visit # / Visits authorized: 19/ 20    Time In: 7:04 AM   Time Out: 8:00 PM  Total Billable Time: 56 minutes    Precautions: Standard    OPERATION: 3/21/23 14 weeks 3 days POD  left  1. Shoulder arthroscopic anterior capsulorrhaphy (CPT 33039) (complex, -22 modifier)  2. Shoulder arthroscopic anterior labral repair (CPT 44701)   3. Shoulder arthroscopic posterior labral repair, capsulorrhaphy, 7:00 position (CPT 77458)  4. Shoulder arthroscopic extensive debridement (anterior, posterior glenohumeral joint) (CPT 04730)  5. Shoulder manipulation under anesthesia (CPT 34215)  6. Shoulder arthroscopic lysis of adhesions (CPT 39156):  7. Shoulder arthroscopic SLAP type 8 repair (CPT 56567)  8. Shoulder arthroscopic rotator cuff repair (rotator interval closure, supraspinatus to subscapularis, CPT 08165)    POST OPERATIVE PLAN: We will follow the arthroscopic Bankart repair and SLAP repair guidelines. We discussed with the patient's family after surgery. The patient will remain in a sling for 6 weeks. We will start PT at the 3 week wandy.     Quality of tissue: Good    Quality of the repair: Good    Subjective     Pt reports: feeling better still stiff    He was compliant with home exercise program.  Response to previous treatment: sore  Functional change: using LUE more at home     Pain: 4/10  Location: left shoulder      Objective   AROM pre  session 130, 150 post session    Dieudonne received therapeutic exercises to develop strength, endurance, ROM, flexibility, and posture for 10 minutes including:  Patient education on HEP and team workouts  Sidelying Cross Body Stretch 10 x :10     NT  Sidelying ER pertubations 3 x 15   Serratus press GTB 3 x 8  Supine wand flexion 30x  Prone extension 2# 3 x 10  Sidelying dowel flexion   Prone row 1# 3 x 10    Dieudonne received the following manual therapy techniques: Joint mobilizations and Soft tissue Mobilization were applied to the: L shoulder for 15 minutes, including:    Gr I-II oscillations  Gr III flexion 0-160 degrees  Elbow extension to 0 deg  ER arm by side 0-45 deg  STM lat, and subscap  Inferior mob seated at 90/90 Gr II  Manual lat stretch   Manual Scap pin + cross body stretch     Dieudonne participated in neuromuscular re-education activities to improve: Coordination, Kinesthetic, Sense, Proprioception, and Posture for 24 minutes. The following activities were included:  PT resisted ER/IR at 90/90 3x10  Standing Handcuffs with perturbations x 30   Supine ER @ 90/90 3x10 RTB  Band ER @ side 3 x 30 GTB   Band IR @ side 3 x 30 GTB   Prone I/T/Y 2# 3 x 8 iso hold opposite side   Serratus HHR 30x        NT  Swiss ball pot stir ground 3x fatigue   Body blade over 90/90 5x to fatigue  Quadruped wall ball: up/down, side/side, CW,CCW  PT resisted PNF RS 2x10  Rebounder baseketball 3 planes 20x  Quadruped 4# med ball on wall 3x to fatigue   Serratus foam roll up wall 30x  Cross body rebounder 20x  OTB ER at 90 deg flexion-> flexion 3x15  Cross body press 2# into wall 2 x 10    Dieudonne participated in dynamic functional therapeutic activities to improve functional performance for 7 minutes, including:  Barbell landmine press 4x10      NT  LUE serratus shots with basketball 20x  Standing UBE 8' level 7 to improve cardio endurance, functional reaching, push/pull  OH rebounding catch 20x  Upside down KB 10#  pertubations 3x to fatigue   Chest press 10# 3 x 8  Genie IR/ER yellow cord 2 x 15  Propped sitting with light ER 0-20 deg 2 x 15  Serratus press OTB 3 x 12  Lateral walks GTB above knees with 4# med ball press 4 laps - mimic basketball defensive sliding  Serratus punch orange cord 3 x 15  Med ball 6# slams at ground 30x  Y with lift off 2 x 15    Dieudonne received cold pack for 0 minutes to to decrease circulation, pain, and swelling.      Home Exercises Provided and Patient Education Provided     Education provided:   - improve cuff stabilization     Written Home Exercises Provided: Patient instructed to cont prior HEP.  Exercises were reviewed and Dieudonne was able to demonstrate them prior to the end of the session.  Dieudonne demonstrated good  understanding of the education provided.     See EMR under Patient Instructions for exercises provided prior visit.    Assessment   Improvement noted in FE actively upon arrival to session although continues to be limited at end range. Continues to demo restrictions into external rotation 2/2 guarding, joint mobility and soft tissue restrictions. Fatigues quickly with isolated cuff and periscapular strengthening. Extensive education provided to continue stretching and cuff activation exercises ahead of f/u w/Dr. Snow next Monday.     Dieudonne Is progressing well towards his goals.   Pt prognosis is Excellent.     Pt will continue to benefit from skilled outpatient physical therapy to address the deficits listed in the problem list box on initial evaluation, provide pt/family education and to maximize pt's level of independence in the home and community environment.     Pt's spiritual, cultural and educational needs considered and pt agreeable to plan of care and goals.    Anticipated barriers to physical therapy: travel    GOALS: Short Term Goals:  3 weeks(Progressing, not met)  1.Report decreased shoulder pain < / =  3/10  to increase tolerance for return to ADL out the  sling  2. Increase PROM return to 90 deg flexion and 10 deg ER   3. Increased strength by 1/3 MMT grade in  L scap to increase tolerance for ADL and work activities.  4. Pt to tolerate HEP to improve ROM and independence with ADL's     Long Term Goals: 24 weeks(Progressing, not met)  1.Report decreased shoulder pain  < / =  1 /10  to increase tolerance for return to basketball  2.Increase AROM to full motion in shoulder pain free  3.Increase strength to >/= 4/5 in L shoulder stabilizers to increase tolerance for ADL and work activities.  4. Pt goal: return to basketball without L shoulder pain   5. Pt will have improved gcode of CJ (20-40% limited) on FOTO shoulder in order to demonstrate true functional improvement.     Plan     Plan of care Certification: 4/11/2023 to 10/11/2023.    Continue to progress through SLAP/Bankart protocol    JUAN FRANCISCO MORGAN, PT, DPT, SCS

## 2023-07-24 ENCOUNTER — OFFICE VISIT (OUTPATIENT)
Dept: SPORTS MEDICINE | Facility: CLINIC | Age: 18
End: 2023-07-24
Payer: COMMERCIAL

## 2023-07-24 VITALS — BODY MASS INDEX: 27.19 KG/M2 | WEIGHT: 235 LBS | HEIGHT: 78 IN

## 2023-07-24 DIAGNOSIS — M25.312 SHOULDER INSTABILITY, LEFT: ICD-10-CM

## 2023-07-24 DIAGNOSIS — M25.519 SHOULDER PAIN, UNSPECIFIED CHRONICITY, UNSPECIFIED LATERALITY: Primary | ICD-10-CM

## 2023-07-24 PROCEDURE — 99214 PR OFFICE/OUTPT VISIT, EST, LEVL IV, 30-39 MIN: ICD-10-PCS | Mod: S$GLB,,, | Performed by: ORTHOPAEDIC SURGERY

## 2023-07-24 PROCEDURE — 99999 PR PBB SHADOW E&M-EST. PATIENT-LVL II: ICD-10-PCS | Mod: PBBFAC,,, | Performed by: ORTHOPAEDIC SURGERY

## 2023-07-24 PROCEDURE — 99999 PR PBB SHADOW E&M-EST. PATIENT-LVL II: CPT | Mod: PBBFAC,,, | Performed by: ORTHOPAEDIC SURGERY

## 2023-07-24 PROCEDURE — 99214 OFFICE O/P EST MOD 30 MIN: CPT | Mod: S$GLB,,, | Performed by: ORTHOPAEDIC SURGERY

## 2023-07-24 NOTE — PROGRESS NOTES
POST-OPERATIVE EXAMINATION, L shoulder pain    18 y.o. Male who returns for follow after surgery. He is 17 weeks s/p:    SANE 85    3/21/23  Left  1. Shoulder arthroscopic anterior capsulorrhaphy (CPT 69085) (complex, -22 modifier)  2. Shoulder arthroscopic anterior labral repair (CPT 57840)   3. Shoulder arthroscopic posterior labral repair, capsulorrhaphy, 7:00 position (CPT 27973)  4. Shoulder arthroscopic extensive debridement (anterior, posterior glenohumeral joint) (CPT 65955)  5. Shoulder manipulation under anesthesia (CPT 23085)  6. Shoulder arthroscopic lysis of adhesions (CPT 13605):  7. Shoulder arthroscopic SLAP type 8 repair (CPT 63452)  8. Shoulder arthroscopic rotator cuff repair (rotator interval closure, supraspinatus to subscapularis, CPT 90065)    He is doing well without any issues. Wearing his sling as instructed. He began therapy a few weeks ago. He has been following his post op restrictions.      PAST MEDICAL HISTORY: No past medical history on file.  PAST SURGICAL HISTORY:   Past Surgical History:   Procedure Laterality Date    ARTHROSCOPIC DEBRIDEMENT OF SHOULDER Left 3/21/2023    Procedure: DEBRIDEMENT, SHOULDER, ARTHROSCOPIC;  Surgeon: Tiffani Snow MD;  Location: [facility] OR;  Service: Orthopedics;  Laterality: Left;    CAPSULORRHAPHY Left 3/21/2023    Procedure: CAPSULORRHAPHY;  Surgeon: Tiffani Snow MD;  Location: [facility] OR;  Service: Orthopedics;  Laterality: Left;    REPAIR OF SUPERIOR LABRAL ANTERIOR-TO-POSTERIOR (SLAP) TEAR OF SHOULDER Left 3/21/2023    Procedure: REPAIR, SLAP LESION, SHOULDER;  Surgeon: Tiffani Snow MD;  Location: [facility] OR;  Service: Orthopedics;  Laterality: Left;    SHOULDER ARTHROSCOPY W/ BANKHART PROCEDURE Left 3/21/2023    Procedure: ARTHROSCOPY, SHOULDER, WITH BANKART REPAIR;  Surgeon: Tiffani Snow MD;  Location: [facility] OR;  Service: Orthopedics;  Laterality: Left;     FAMILY HISTORY:   Family History   Problem Relation Age of Onset    No Known Problems Mother     No  "Known Problems Father      SOCIAL HISTORY:   Social History     Socioeconomic History    Marital status: Single   Tobacco Use    Smoking status: Never    Smokeless tobacco: Never   Substance and Sexual Activity    Alcohol use: Yes    Drug use: Never    Sexual activity: Yes       MEDICATIONS:   Current Outpatient Medications:     aspirin (ECOTRIN) 81 MG EC tablet, Take 1 tablet (81 mg total) by mouth once daily. For 4 weeks starting the day after surgery. (Patient not taking: Reported on 5/1/2023), Disp: 28 tablet, Rfl: 0    meloxicam (MOBIC) 15 MG tablet, Take 1 tablet (15 mg total) by mouth once daily. (Patient not taking: Reported on 5/1/2023), Disp: 30 tablet, Rfl: 0    oxyCODONE-acetaminophen (PERCOCET)  mg per tablet, Take 1 tablet by mouth every 4-6 hours as needed for pain. Take stool softener with this medication. (Patient not taking: Reported on 5/1/2023), Disp: 21 tablet, Rfl: 0    promethazine (PHENERGAN) 25 MG tablet, Take 1 tablet (25 mg total) by mouth every 6 (six) hours as needed for Nausea. (Patient not taking: Reported on 5/1/2023), Disp: 8 tablet, Rfl: 0    traMADoL (ULTRAM) 50 mg tablet, Take 1-2 tablets ( mg total) by mouth every 6 (six) hours as needed for Pain. (Patient not taking: Reported on 5/1/2023), Disp: 21 tablet, Rfl: 0  ALLERGIES: Review of patient's allergies indicates:  No Known Allergies    VITAL SIGNS: Ht 6' 8" (2.032 m)   Wt 106.6 kg (235 lb)   BMI 25.82 kg/m²      Review of Systems   Constitution: Negative. Negative for chills, fever and night sweats.   HENT: Negative for congestion and headaches.    Eyes: Negative for blurred vision, left vision loss and right vision loss.   Cardiovascular: Negative for chest pain and syncope.   Respiratory: Negative for cough and shortness of breath.    Endocrine: Negative for polydipsia, polyphagia and polyuria.   Hematologic/Lymphatic: Negative for bleeding problem. Does not bruise/bleed easily.   Skin: Negative for dry skin, " "itching and rash.   Musculoskeletal: Negative for falls and muscle weakness.   Gastrointestinal: Negative for abdominal pain and bowel incontinence.   Genitourinary: Negative for bladder incontinence and nocturia.   Neurological: Negative for disturbances in coordination, loss of balance and seizures.   Psychiatric/Behavioral: Negative for depression. The patient does not have insomnia.    Allergic/Immunologic: Negative for hives and persistent infections.     PHYSICAL EXAMINATION:  Ht 6' 8" (2.032 m)   Wt 106.6 kg (235 lb)   BMI 25.82 kg/m²   General: Well-developed well-nourished 18 y.o. malein no acute distress   Cardiovascular: Regular rhythm   Lungs: No labored breathing or wheezing appreciated   Neuro: Alert and oriented ×3   Psychiatric: well oriented to person, place and time, demonstrates normal mood and affect   Skin: No rashes, lesions or ulcers, normal temperature, turgor, and texture on involved extremity    ORTHOPEDIC EXAM:  Normal post-operative swelling  Normal post-operative scarring  Surgical incisions healing well with no outward signs of infection. No tenderness about the shoulder today  Strength: Not tested  ROM: ER 0:50, FF-155, IR T12  Tests: None    ASSESSMENT:      ICD-10-CM ICD-9-CM   1. Shoulder pain, unspecified chronicity, unspecified laterality  M25.519 719.41   2. Shoulder instability, left  M25.312 718.81             PLAN:       Motion is continuing  to improve  Continue PT per protocol; arthroscopic Bankart repair and SLAP repair protocol  RTC with Tiffani Snow MD in 8 weeks  4. Discussed with PT                    "

## 2023-07-26 ENCOUNTER — CLINICAL SUPPORT (OUTPATIENT)
Dept: REHABILITATION | Facility: HOSPITAL | Age: 18
End: 2023-07-26
Payer: COMMERCIAL

## 2023-07-26 DIAGNOSIS — M25.512 ACUTE PAIN OF LEFT SHOULDER: Primary | ICD-10-CM

## 2023-07-26 DIAGNOSIS — M25.619 LIMITED RANGE OF MOTION (ROM) OF SHOULDER: ICD-10-CM

## 2023-07-26 PROCEDURE — 97140 MANUAL THERAPY 1/> REGIONS: CPT

## 2023-07-26 PROCEDURE — 97530 THERAPEUTIC ACTIVITIES: CPT

## 2023-07-26 PROCEDURE — 97112 NEUROMUSCULAR REEDUCATION: CPT

## 2023-07-26 NOTE — PLAN OF CARE
Re-assessment/ update POC    Physical Therapy Daily Treatment Note       Name: Dieudonne Martin  Clinic Number: [id number]    Therapy Diagnosis:   Encounter Diagnoses   Name Primary?    Acute pain of left shoulder Yes    Limited range of motion (ROM) of shoulder      Physician: Augustus Sepulveda MD    Visit Date: 7/26/2023  Physician Orders: PT Eval and Treat   Medical Diagnosis from Referral:   M25.312 (ICD-10-CM) - Instability of left shoulder joint   M75.22 (ICD-10-CM) - Biceps tendonitis on left   Evaluation Date: 4/11/2023  Authorization Period Expiration: 12/31/2023  Plan of Care Expiration: 10/11/2023  Visit # / Visits authorized: 2-/ 20    Time In: 816 AM   Time Out: 915 PM  Total Billable Time: 59 minutes    Precautions: Standard    OPERATION: 3/21/23 18 weeks 3 days POD  left  1. Shoulder arthroscopic anterior capsulorrhaphy (CPT 73730) (complex, -22 modifier)  2. Shoulder arthroscopic anterior labral repair (CPT 65022)   3. Shoulder arthroscopic posterior labral repair, capsulorrhaphy, 7:00 position (CPT 75453)  4. Shoulder arthroscopic extensive debridement (anterior, posterior glenohumeral joint) (CPT 58713)  5. Shoulder manipulation under anesthesia (CPT 52672)  6. Shoulder arthroscopic lysis of adhesions (CPT 25318):  7. Shoulder arthroscopic SLAP type 8 repair (CPT 19694)  8. Shoulder arthroscopic rotator cuff repair (rotator interval closure, supraspinatus to subscapularis, CPT 63749)    POST OPERATIVE PLAN: We will follow the arthroscopic Bankart repair and SLAP repair guidelines. We discussed with the patient's family after surgery. The patient will remain in a sling for 6 weeks. We will start PT at the 3 week wandy.     Quality of tissue: Good    Quality of the repair: Good    Subjective     Pt reports: had f/u with Dr. Snow, went well. 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 Leaves next week for vacation    He was compliant with home exercise program.  Response to previous  treatment: sore  Functional change: using LUE more at home     Pain: 4/10  Location: left shoulder      Objective     Passive Range of Motion:   Shoulder Right Left   Flexion 180 150   Abduction 90 90   ER at 0 60 70   ER at 90 100 90   IR at 90 60 50      Active Range of Motion:   Shoulder Right Left   Flexion 180 150   Abduction 180 140   ER at 0 90 70   ER at 90 100 80   IR (behind back) T6 T12       Right Left Comment   Shoulder flexion: 5/5 4-/5    Shoulder Abduction: 5/5 3+/5    Shoulder ER: 5/5 4-/5    Shoulder IR: 5/5 3+/5    Lower Trap: 4-/5 3+/5    Middle Trap: 4-/5 3+/5     5/5 4/5      Dieudonne received therapeutic exercises to develop strength, endurance, ROM, flexibility, and posture for 2 minutes including:  Patient education on HEP and team workouts    NT  Sidelying Cross Body Stretch 10 x :10   Sidelying ER pertubations 3 x 15   Serratus press GTB 3 x 8  Supine wand flexion 30x  Prone extension 2# 3 x 10  Sidelying dowel flexion   Prone row 1# 3 x 10    Dieudonne received the following manual therapy techniques: Joint mobilizations and Soft tissue Mobilization were applied to the: L shoulder for 15 minutes, including:    Gr I-II oscillations  Gr III flexion 0-160 degrees  Elbow extension to 0 deg  ER arm by side 0-45 deg  STM lat, and subscap  Inferior mob seated at 90/90 Gr II  Manual lat stretch   Manual Scap pin + cross body stretch     Dieudonne participated in neuromuscular re-education activities to improve: Coordination, Kinesthetic, Sense, Proprioception, and Posture for 28 minutes. The following activities were included:    PT resisted ER/IR at 90/90 3x10  Standing Handcuffs with perturbations x 30   Supine ER @ 90/90 3x10 RTB  Band ER @ side 3 x 30 GTB   Band IR @ side 3 x 30 GTB   Prone I/T/Y 2# 3 x 8 iso hold opposite side   OTB snow angels 3x15    NT  Serratus HHR 30x  Swiss ball pot stir ground 3x fatigue   Body blade over 90/90 5x to fatigue  Quadruped wall ball: up/down, side/side,  "CW,CCW  PT resisted PNF RS 2x10  Rebounder baseketball 3 planes 20x  Quadruped 4# med ball on wall 3x to fatigue   Serratus foam roll up wall 30x  Cross body rebounder 20x  OTB ER at 90 deg flexion-> flexion 3x15  Cross body press 2# into wall 2 x 10    Dieudonne participated in dynamic functional therapeutic activities to improve functional performance for 12 minutes, including:    Push up on 24" box 3x10  Trampoline plyos   6# med ball chest pass 4x20   4# med ball chops 4x20      NT  LUE serratus shots with basketball 20x  Standing UBE 8' level 7 to improve cardio endurance, functional reaching, push/pull  OH rebounding catch 20x  Upside down KB 10# pertubations 3x to fatigue   Chest press 10# 3 x 8  Genie IR/ER yellow cord 2 x 15  Propped sitting with light ER 0-20 deg 2 x 15  Serratus press OTB 3 x 12  Lateral walks GTB above knees with 4# med ball press 4 laps - mimic basketball defensive sliding  Serratus punch orange cord 3 x 15  Med ball 6# slams at ground 30x  Y with lift off 2 x 15    Dieudonne received cold pack for 0 minutes to to decrease circulation, pain, and swelling.      Home Exercises Provided and Patient Education Provided     Education provided:   - improve cuff stabilization     Written Home Exercises Provided: Patient instructed to cont prior HEP.  Exercises were reviewed and Dieudonne was able to demonstrate them prior to the end of the session.  Dieudonne demonstrated good  understanding of the education provided.     See EMR under Patient Instructions for exercises provided prior visit.    Assessment     Pt presents w/ improved carryover of ROM from last visit. Pt continues to demonstrates limitations in AROM/PROM vs other side. Pt with gross weakness to L UE as well. Due to impairments pt is at increased risk for re-injury upon return to play. Will continue to benefit from skilled therapy to address deficits. Progressed CKC strengthening today with good tolerance. Initiated plyometrics today, pt " denies adverse effects. Will continue to progress strength/ ROM as tolerated.    Dieudonne Is progressing well towards his goals.   Pt prognosis is Excellent.     Pt will continue to benefit from skilled outpatient physical therapy to address the deficits listed in the problem list box on initial evaluation, provide pt/family education and to maximize pt's level of independence in the home and community environment.     Pt's spiritual, cultural and educational needs considered and pt agreeable to plan of care and goals.    Anticipated barriers to physical therapy: travel    GOALS: Short Term Goals:  3 weeks(Progressing, not met)  1.Report decreased shoulder pain < / =  3/10  to increase tolerance for return to ADL out the sling  2. Increase PROM return to 90 deg flexion and 10 deg ER   3. Increased strength by 1/3 MMT grade in  L scap to increase tolerance for ADL and work activities.  4. Pt to tolerate HEP to improve ROM and independence with ADL's     Long Term Goals: 24 weeks(Progressing, not met)  1.Report decreased shoulder pain  < / =  1 /10  to increase tolerance for return to basketball  2.Increase AROM to full motion in shoulder pain free  3.Increase strength to >/= 4/5 in L shoulder stabilizers to increase tolerance for ADL and work activities.  4. Pt goal: return to basketball without L shoulder pain   5. Pt will have improved gcode of CJ (20-40% limited) on FOTO shoulder in order to demonstrate true functional improvement.     Plan     Plan of care Certification: 4/11/2023 to 10/11/2023.    Continue to progress through SLAP/Bankart protocol    Randolph Seaman, PT, DPT, SCS, OCS

## 2023-07-26 NOTE — PROGRESS NOTES
Re-assessment/ update POC    Physical Therapy Daily Treatment Note       Name: iDeudonne Martin  Clinic Number: [id number]    Therapy Diagnosis:   Encounter Diagnoses   Name Primary?    Acute pain of left shoulder Yes    Limited range of motion (ROM) of shoulder      Physician: Augustus Sepulveda MD    Visit Date: 7/26/2023  Physician Orders: PT Eval and Treat   Medical Diagnosis from Referral:   M25.312 (ICD-10-CM) - Instability of left shoulder joint   M75.22 (ICD-10-CM) - Biceps tendonitis on left   Evaluation Date: 4/11/2023  Authorization Period Expiration: 12/31/2023  Plan of Care Expiration: 10/11/2023  Visit # / Visits authorized: 2-/ 20    Time In: 816 AM   Time Out: 915 PM  Total Billable Time: 59 minutes    Precautions: Standard    OPERATION: 3/21/23 18 weeks 3 days POD  left  1. Shoulder arthroscopic anterior capsulorrhaphy (CPT 92323) (complex, -22 modifier)  2. Shoulder arthroscopic anterior labral repair (CPT 29246)   3. Shoulder arthroscopic posterior labral repair, capsulorrhaphy, 7:00 position (CPT 40663)  4. Shoulder arthroscopic extensive debridement (anterior, posterior glenohumeral joint) (CPT 16303)  5. Shoulder manipulation under anesthesia (CPT 74703)  6. Shoulder arthroscopic lysis of adhesions (CPT 14861):  7. Shoulder arthroscopic SLAP type 8 repair (CPT 85364)  8. Shoulder arthroscopic rotator cuff repair (rotator interval closure, supraspinatus to subscapularis, CPT 40352)    POST OPERATIVE PLAN: We will follow the arthroscopic Bankart repair and SLAP repair guidelines. We discussed with the patient's family after surgery. The patient will remain in a sling for 6 weeks. We will start PT at the 3 week wandy.     Quality of tissue: Good    Quality of the repair: Good    Subjective     Pt reports: had f/u with Dr. Snow, went well. 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 Leaves next week for vacation    He was compliant with home exercise program.  Response to previous  treatment: sore  Functional change: using LUE more at home     Pain: 4/10  Location: left shoulder      Objective     Passive Range of Motion:   Shoulder Right Left   Flexion 180 150   Abduction 90 90   ER at 0 60 70   ER at 90 100 90   IR at 90 60 50      Active Range of Motion:   Shoulder Right Left   Flexion 180 150   Abduction 180 140   ER at 0 90 70   ER at 90 100 80   IR (behind back) T6 T12       Right Left Comment   Shoulder flexion: 5/5 4-/5    Shoulder Abduction: 5/5 3+/5    Shoulder ER: 5/5 4-/5    Shoulder IR: 5/5 3+/5    Lower Trap: 4-/5 3+/5    Middle Trap: 4-/5 3+/5     5/5 4/5      Dieudonne received therapeutic exercises to develop strength, endurance, ROM, flexibility, and posture for 2 minutes including:  Patient education on HEP and team workouts    NT  Sidelying Cross Body Stretch 10 x :10   Sidelying ER pertubations 3 x 15   Serratus press GTB 3 x 8  Supine wand flexion 30x  Prone extension 2# 3 x 10  Sidelying dowel flexion   Prone row 1# 3 x 10    Dieudonne received the following manual therapy techniques: Joint mobilizations and Soft tissue Mobilization were applied to the: L shoulder for 15 minutes, including:    Gr I-II oscillations  Gr III flexion 0-160 degrees  Elbow extension to 0 deg  ER arm by side 0-45 deg  STM lat, and subscap  Inferior mob seated at 90/90 Gr II  Manual lat stretch   Manual Scap pin + cross body stretch     Dieudonne participated in neuromuscular re-education activities to improve: Coordination, Kinesthetic, Sense, Proprioception, and Posture for 28 minutes. The following activities were included:    PT resisted ER/IR at 90/90 3x10  Standing Handcuffs with perturbations x 30   Supine ER @ 90/90 3x10 RTB  Band ER @ side 3 x 30 GTB   Band IR @ side 3 x 30 GTB   Prone I/T/Y 2# 3 x 8 iso hold opposite side   OTB snow angels 3x15    NT  Serratus HHR 30x  Swiss ball pot stir ground 3x fatigue   Body blade over 90/90 5x to fatigue  Quadruped wall ball: up/down, side/side,  "CW,CCW  PT resisted PNF RS 2x10  Rebounder baseketball 3 planes 20x  Quadruped 4# med ball on wall 3x to fatigue   Serratus foam roll up wall 30x  Cross body rebounder 20x  OTB ER at 90 deg flexion-> flexion 3x15  Cross body press 2# into wall 2 x 10    Dieudonne participated in dynamic functional therapeutic activities to improve functional performance for 12 minutes, including:    Push up on 24" box 3x10  Trampoline plyos   6# med ball chest pass 4x20   4# med ball chops 4x20      NT  LUE serratus shots with basketball 20x  Standing UBE 8' level 7 to improve cardio endurance, functional reaching, push/pull  OH rebounding catch 20x  Upside down KB 10# pertubations 3x to fatigue   Chest press 10# 3 x 8  Genie IR/ER yellow cord 2 x 15  Propped sitting with light ER 0-20 deg 2 x 15  Serratus press OTB 3 x 12  Lateral walks GTB above knees with 4# med ball press 4 laps - mimic basketball defensive sliding  Serratus punch orange cord 3 x 15  Med ball 6# slams at ground 30x  Y with lift off 2 x 15    Dieudonne received cold pack for 0 minutes to to decrease circulation, pain, and swelling.      Home Exercises Provided and Patient Education Provided     Education provided:   - improve cuff stabilization     Written Home Exercises Provided: Patient instructed to cont prior HEP.  Exercises were reviewed and Dieudonne was able to demonstrate them prior to the end of the session.  Dieudonne demonstrated good  understanding of the education provided.     See EMR under Patient Instructions for exercises provided prior visit.    Assessment     Pt presents w/ improved carryover of ROM from last visit. Pt continues to demonstrates limitations in AROM/PROM vs other side. Pt with gross weakness to L UE as well. Due to impairments pt is at increased risk for re-injury upon return to play. Will continue to benefit from skilled therapy to address deficits. Progressed CKC strengthening today with good tolerance. Initiated plyometrics today, pt " denies adverse effects. Will continue to progress strength/ ROM as tolerated.    Dieudonne Is progressing well towards his goals.   Pt prognosis is Excellent.     Pt will continue to benefit from skilled outpatient physical therapy to address the deficits listed in the problem list box on initial evaluation, provide pt/family education and to maximize pt's level of independence in the home and community environment.     Pt's spiritual, cultural and educational needs considered and pt agreeable to plan of care and goals.    Anticipated barriers to physical therapy: travel    GOALS: Short Term Goals:  3 weeks(Progressing, not met)  1.Report decreased shoulder pain < / =  3/10  to increase tolerance for return to ADL out the sling  2. Increase PROM return to 90 deg flexion and 10 deg ER   3. Increased strength by 1/3 MMT grade in  L scap to increase tolerance for ADL and work activities.  4. Pt to tolerate HEP to improve ROM and independence with ADL's     Long Term Goals: 24 weeks(Progressing, not met)  1.Report decreased shoulder pain  < / =  1 /10  to increase tolerance for return to basketball  2.Increase AROM to full motion in shoulder pain free  3.Increase strength to >/= 4/5 in L shoulder stabilizers to increase tolerance for ADL and work activities.  4. Pt goal: return to basketball without L shoulder pain   5. Pt will have improved gcode of CJ (20-40% limited) on FOTO shoulder in order to demonstrate true functional improvement.     Plan     Plan of care Certification: 4/11/2023 to 10/11/2023.    Continue to progress through SLAP/Bankart protocol    Randolph Seaman, PT, DPT, SCS, OCS

## 2023-09-01 ENCOUNTER — CLINICAL SUPPORT (OUTPATIENT)
Dept: REHABILITATION | Facility: HOSPITAL | Age: 18
End: 2023-09-01
Payer: COMMERCIAL

## 2023-09-01 DIAGNOSIS — M25.512 ACUTE PAIN OF LEFT SHOULDER: Primary | ICD-10-CM

## 2023-09-01 DIAGNOSIS — M25.619 LIMITED RANGE OF MOTION (ROM) OF SHOULDER: ICD-10-CM

## 2023-09-01 PROCEDURE — 97112 NEUROMUSCULAR REEDUCATION: CPT | Performed by: PHYSICAL THERAPIST

## 2023-09-01 PROCEDURE — 97530 THERAPEUTIC ACTIVITIES: CPT | Performed by: PHYSICAL THERAPIST

## 2023-09-01 PROCEDURE — 97110 THERAPEUTIC EXERCISES: CPT | Performed by: PHYSICAL THERAPIST

## 2023-09-01 PROCEDURE — 97140 MANUAL THERAPY 1/> REGIONS: CPT | Performed by: PHYSICAL THERAPIST

## 2023-09-01 NOTE — PROGRESS NOTES
Physical Therapy Daily Treatment Note       Name: Dieudonne [facility] Number: [id number]    Therapy Diagnosis:   Encounter Diagnoses   Name Primary?    Acute pain of left shoulder Yes    Limited range of motion (ROM) of shoulder        Physician: Milton Yap III, *    Visit Date: 9/1/2023  Physician Orders: PT Eval and Treat   Medical Diagnosis from Referral:   M25.312 (ICD-10-CM) - Instability of left shoulder joint   M75.22 (ICD-10-CM) - Biceps tendonitis on left   Evaluation Date: 4/11/2023  Authorization Period Expiration: 12/31/2023  Plan of Care Expiration: 10/11/2023  Visit # / Visits authorized: 1/ 20  Total visits 19    Time In: 1100 AM   Time Out: 1158 AM  Total Billable Time: 58 minutes    Precautions: Standard    OPERATION: 3/21/23 5 MONTHS 11 DAYS  left  1. Shoulder arthroscopic anterior capsulorrhaphy (CPT 54979) (complex, -22 modifier)  2. Shoulder arthroscopic anterior labral repair (CPT 28300)   3. Shoulder arthroscopic posterior labral repair, capsulorrhaphy, 7:00 position (CPT 03278)  4. Shoulder arthroscopic extensive debridement (anterior, posterior glenohumeral joint) (CPT 53919)  5. Shoulder manipulation under anesthesia (CPT 49608)  6. Shoulder arthroscopic lysis of adhesions (CPT 69694):  7. Shoulder arthroscopic SLAP type 8 repair (CPT 06198)  8. Shoulder arthroscopic rotator cuff repair (rotator interval closure, supraspinatus to subscapularis, CPT 30970)    POST OPERATIVE PLAN: We will follow the arthroscopic Bankart repair and SLAP repair guidelines. We discussed with the patient's family after surgery. The patient will remain in a sling for 6 weeks. We will start PT at the 3 week wandy.     Quality of tissue: Good    Quality of the repair: Good    Subjective     Pt reports: I've been practicing and feeling good, just feels a little tight overhead     He was compliant with home exercise program.  Response to previous treatment: sore  Functional change: using LUE more at home  "    Pain: 4/10  Location: left shoulder      Objective     Passive Range of Motion:   Shoulder Right Left   Flexion 180 170   Abduction 90 90   ER at 0 60 70   ER at 90 100 80   IR at 90 60 50      Active Range of Motion:   Shoulder Right Left   Flexion 180 160   Abduction 180 150   ER at 0 90 70   ER at 90 100 80   IR (behind back) T6 T12       Right Left Comment   Shoulder flexion: 5/5 4-/5    Shoulder Abduction: 5/5 3+/5    Shoulder ER: 5/5 4-/5    Shoulder IR: 5/5 3+/5    Lower Trap: 4-/5 3+/5    Middle Trap: 4-/5 3+/5     5/5 4/5      Dieudonne received therapeutic exercises to develop strength, endurance, ROM, flexibility, and posture for 8 minutes including:  Patient education on HEP and team workouts  LLLD ER 90/90 3'    NT  Sidelying Cross Body Stretch 10 x :10   Sidelying ER pertubations 3 x 15   Serratus press GTB 3 x 8  Supine wand flexion 30x  Prone extension 2# 3 x 10  Sidelying dowel flexion   Prone row 1# 3 x 10    Dieudonne received the following manual therapy techniques: Joint mobilizations and Soft tissue Mobilization were applied to the: L shoulder for 10 minutes, including:    Gr I-II oscillations  Gr III flexion 0-160 degrees  STM lat, and subscap  Inferior mob seated at 90/90 Gr II  Manual lat stretch   Manual Scap pin + cross body stretch     Dieudonne participated in neuromuscular re-education activities to improve: Coordination, Kinesthetic, Sense, Proprioception, and Posture for 24 minutes. The following activities were included:    High row with ER OTB 3 x 15  Landmine serratus press 70# 2 x 15  Prone over swiss ball I/T/Y 2# 3 x 8    Dieudonne participated in dynamic functional therapeutic activities to improve functional performance for 16 minutes, including:    Serratus med ball cross slams 10# 3 x 20  Serratus HHR 20x  Scaptions 3# 3 x 15    NT  Push up on 24" box 3x10  Trampoline plyos   6# med ball chest pass 4x20   4# med ball chops 4x20  LUE serratus shots with basketball " 20x  Standing UBE 8' level 7 to improve cardio endurance, functional reaching, push/pull  OH rebounding catch 20x  Upside down KB 10# pertubations 3x to fatigue   Chest press 10# 3 x 8  Genie IR/ER yellow cord 2 x 15  Propped sitting with light ER 0-20 deg 2 x 15  Serratus press OTB 3 x 12  Lateral walks GTB above knees with 4# med ball press 4 laps - mimic basketball defensive sliding  Serratus punch orange cord 3 x 15  Med ball 6# slams at ground 30x  Y with lift off 2 x 15    Dieudonne received cold pack for 0 minutes to to decrease circulation, pain, and swelling.      Home Exercises Provided and Patient Education Provided     Education provided:   - improve cuff stabilization     Written Home Exercises Provided: Patient instructed to cont prior HEP.  Exercises were reviewed and Dieudonne was able to demonstrate them prior to the end of the session.  Dieudonne demonstrated good  understanding of the education provided.     See EMR under Patient Instructions for exercises provided prior visit.    Assessment     Pt returned to therapy for first visit in over a month. Has returned to basketball but lacking ER at 90/90 and scaption strength is very weak. Progressed cuff strengthening overhead and loaded serratus anterior    Dieudonne Is progressing well towards his goals.   Pt prognosis is Excellent.     Pt will continue to benefit from skilled outpatient physical therapy to address the deficits listed in the problem list box on initial evaluation, provide pt/family education and to maximize pt's level of independence in the home and community environment.     Pt's spiritual, cultural and educational needs considered and pt agreeable to plan of care and goals.    Anticipated barriers to physical therapy: travel    GOALS: Short Term Goals:  3 weeks(Progressing, not met)  1.Report decreased shoulder pain < / =  3/10  to increase tolerance for return to ADL out the sling  2. Increase PROM return to 90 deg flexion and 10 deg ER    3. Increased strength by 1/3 MMT grade in  L scap to increase tolerance for ADL and work activities.  4. Pt to tolerate HEP to improve ROM and independence with ADL's     Long Term Goals: 24 weeks(Progressing, not met)  1.Report decreased shoulder pain  < / =  1 /10  to increase tolerance for return to basketball  2.Increase AROM to full motion in shoulder pain free  3.Increase strength to >/= 4/5 in L shoulder stabilizers to increase tolerance for ADL and work activities.  4. Pt goal: return to basketball without L shoulder pain   5. Pt will have improved gcode of CJ (20-40% limited) on FOTO shoulder in order to demonstrate true functional improvement.     Plan     Plan of care Certification: 4/11/2023 to 10/11/2023.    Continue to progress through SLAP/Bankart protocol    Devendra Plummer, PT, DPT, SCS, OCS

## 2023-09-05 ENCOUNTER — TELEPHONE (OUTPATIENT)
Dept: SPORTS MEDICINE | Facility: CLINIC | Age: 18
End: 2023-09-05
Payer: COMMERCIAL

## 2023-09-05 DIAGNOSIS — Z98.890 S/P ARTHROSCOPY OF LEFT SHOULDER: ICD-10-CM

## 2023-09-05 DIAGNOSIS — M25.519 SHOULDER PAIN, UNSPECIFIED CHRONICITY, UNSPECIFIED LATERALITY: ICD-10-CM

## 2023-09-05 DIAGNOSIS — M25.312 SHOULDER INSTABILITY, LEFT: Primary | ICD-10-CM

## 2023-10-05 ENCOUNTER — CLINICAL SUPPORT (OUTPATIENT)
Dept: REHABILITATION | Facility: HOSPITAL | Age: 18
End: 2023-10-05
Attending: PHYSICIAN ASSISTANT
Payer: COMMERCIAL

## 2023-10-05 DIAGNOSIS — M25.512 ACUTE PAIN OF LEFT SHOULDER: Primary | ICD-10-CM

## 2023-10-05 DIAGNOSIS — M25.619 LIMITED RANGE OF MOTION (ROM) OF SHOULDER: ICD-10-CM

## 2023-10-05 PROCEDURE — 97112 NEUROMUSCULAR REEDUCATION: CPT | Performed by: PHYSICAL THERAPIST

## 2023-10-05 PROCEDURE — 97530 THERAPEUTIC ACTIVITIES: CPT | Performed by: PHYSICAL THERAPIST

## 2023-10-05 PROCEDURE — 97140 MANUAL THERAPY 1/> REGIONS: CPT | Performed by: PHYSICAL THERAPIST

## 2023-10-05 NOTE — PROGRESS NOTES
Physical Therapy Daily Treatment Note       Name: [facility] Number: [id number]    Therapy Diagnosis:   Encounter Diagnoses   Name Primary?    Acute pain of left shoulder Yes    Limited range of motion (ROM) of shoulder      Physician: Milton Yap III, *    Visit Date: 10/5/2023  Physician Orders: PT Eval and Treat   Medical Diagnosis from Referral:   M25.312 (ICD-10-CM) - Instability of left shoulder joint   M75.22 (ICD-10-CM) - Biceps tendonitis on left   Evaluation Date: 4/11/2023  Authorization Period Expiration: 12/31/2023  Plan of Care Expiration: 10/11/2023  Visit # / Visits authorized: 1/ 20  Total visits 19    Time In: 200 PM   Time Out: 300 PM  Total Billable Time: 60 minutes    Precautions: Standard    OPERATION: 3/21/23   left  1. Shoulder arthroscopic anterior capsulorrhaphy (CPT 57651) (complex, -22 modifier)  2. Shoulder arthroscopic anterior labral repair (CPT 01650)   3. Shoulder arthroscopic posterior labral repair, capsulorrhaphy, 7:00 position (CPT 25668)  4. Shoulder arthroscopic extensive debridement (anterior, posterior glenohumeral joint) (CPT 69077)  5. Shoulder manipulation under anesthesia (CPT 51087)  6. Shoulder arthroscopic lysis of adhesions (CPT 81766):  7. Shoulder arthroscopic SLAP type 8 repair (CPT 01945)  8. Shoulder arthroscopic rotator cuff repair (rotator interval closure, supraspinatus to subscapularis, CPT 64313)    POST OPERATIVE PLAN: We will follow the arthroscopic Bankart repair and SLAP repair guidelines. We discussed with the patient's family after surgery. The patient will remain in a sling for 6 weeks. We will start PT at the 3 week wandy.     Quality of tissue: Good    Quality of the repair: Good    Subjective     Pt reports: Working out and getting stronger. Notes his  said he's weaker on that side than he used to be    He was compliant with home exercise program.  Response to previous treatment: sore  Functional change: using LUE more at  "home     Pain: 4/10  Location: left shoulder      Objective     Passive Range of Motion:   Shoulder Right Left   Flexion 180 170   Abduction 90 90   ER at 0 60 70   ER at 90 100 80   IR at 90 60 50      Active Range of Motion:   Shoulder Right Left   Flexion 180 160   Abduction 180 150   ER at 0 90 70   ER at 90 100 80   IR (behind back) T6 T12       Right Left Comment   Shoulder flexion: 5/5 4-/5    Shoulder Abduction: 5/5 3+/5    Shoulder ER: 5/5 4-/5    Shoulder IR: 5/5 3+/5    Lower Trap: 4-/5 3+/5    Middle Trap: 4-/5 3+/5     5/5 4/5      Dieudonne received therapeutic exercises to develop strength, endurance, ROM, flexibility, and posture for 0 minutes including:      NT  Sidelying Cross Body Stretch 10 x :10   Sidelying ER pertubations 3 x 15   Serratus press GTB 3 x 8  Supine wand flexion 30x  Prone extension 2# 3 x 10  Sidelying dowel flexion   Prone row 1# 3 x 10    Dieudonne received the following manual therapy techniques: Joint mobilizations and Soft tissue Mobilization were applied to the: L shoulder for 14 minutes, including:    Gr I-II oscillations  Gr III flexion 0-160 degrees  STM lat, and subscap  Inferior mob seated at 90/90 Gr II  Manual lat stretch   Manual Scap pin + cross body stretch     Dieudonne participated in neuromuscular re-education activities to improve: Coordination, Kinesthetic, Sense, Proprioception, and Posture for 28 minutes. The following activities were included:    High row with ER OTB with press 3 x 15  Landmine serratus press 45# 3 x 15  Prone over swiss ball I/T/Y 2# 3 x 8  Scaptions 3# 3 x 15    Dieudonne participated in dynamic functional therapeutic activities to improve functional performance for 18 minutes, including:    Retro bear crawls 5 laps  Supine flexion GTB on shoe 4 x 12    NT  Push up on 24" box 3x10  Trampoline plyos   6# med ball chest pass 4x20   4# med ball chops 4x20  LUE serratus shots with basketball 20x  Standing UBE 8' level 7 to improve cardio " endurance, functional reaching, push/pull  OH rebounding catch 20x  Upside down KB 10# pertubations 3x to fatigue   Chest press 10# 3 x 8  Genie IR/ER yellow cord 2 x 15  Propped sitting with light ER 0-20 deg 2 x 15  Serratus press OTB 3 x 12  Lateral walks GTB above knees with 4# med ball press 4 laps - mimic basketball defensive sliding  Serratus punch orange cord 3 x 15  Med ball 6# slams at ground 30x  Y with lift off 2 x 15    Dieudonne received cold pack for 0 minutes to to decrease circulation, pain, and swelling.      Home Exercises Provided and Patient Education Provided     Education provided:   - improve cuff stabilization     Written Home Exercises Provided: Patient instructed to cont prior HEP.  Exercises were reviewed and Dieudonne was able to demonstrate them prior to the end of the session.  Dieudonne demonstrated good  understanding of the education provided.     See EMR under Patient Instructions for exercises provided prior visit.    Assessment     Progressing strength slowly, still lacks cuff end range ER strength but motion continues to slowly improve. He has returned to full basketball and lifting activities. He needs to continue strengthening serratus in closed chain     Dieudonne Is progressing well towards his goals.   Pt prognosis is Excellent.     Pt will continue to benefit from skilled outpatient physical therapy to address the deficits listed in the problem list box on initial evaluation, provide pt/family education and to maximize pt's level of independence in the home and community environment.     Pt's spiritual, cultural and educational needs considered and pt agreeable to plan of care and goals.    Anticipated barriers to physical therapy: travel    GOALS: Short Term Goals:  3 weeks(Progressing, not met)  1.Report decreased shoulder pain < / =  3/10  to increase tolerance for return to ADL out the sling  2. Increase PROM return to 90 deg flexion and 10 deg ER   3. Increased strength by  1/3 MMT grade in  L scap to increase tolerance for ADL and work activities.  4. Pt to tolerate HEP to improve ROM and independence with ADL's     Long Term Goals: 24 weeks(Progressing, not met)  1.Report decreased shoulder pain  < / =  1 /10  to increase tolerance for return to basketball  2.Increase AROM to full motion in shoulder pain free  3.Increase strength to >/= 4/5 in L shoulder stabilizers to increase tolerance for ADL and work activities.  4. Pt goal: return to basketball without L shoulder pain   5. Pt will have improved gcode of CJ (20-40% limited) on FOTO shoulder in order to demonstrate true functional improvement.     Plan     Plan of care Certification: 4/11/2023 to 10/11/2023.    Continue to progress through SLAP/Bankart protocol    Devendra Plummer, PT, DPT, OCS

## 2023-10-09 ENCOUNTER — ATHLETIC TRAINING SESSION (OUTPATIENT)
Dept: SPORTS MEDICINE | Facility: CLINIC | Age: 18
End: 2023-10-09
Payer: COMMERCIAL

## 2023-10-09 NOTE — PROGRESS NOTES
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     During team's first scrimmage, Dieudonne mentioned that he tried saving the ball from an in bounding play. Took a hard fall, Right knee landed directly on the hardwood. Immediate swelling occurred over the area of the injury. Small abrasion over area.    Handedness: right-handed  Sport played:      Level:          Dieudonne also participates in basketball.  Pain    ROS              Objective:       General: Dieudonne is well-developed, well-nourished, appears stated age, in no acute distress, alert and oriented to time, place and person.               Right Knee Exam     Inspection   Effusion: present    Tenderness   The patient is tender to palpation of the patellar tendon and patella.    Range of Motion   Flexion:  abnormal     Tests   Patella   Patellar Tracking: normal    Muscle Strength   Right Lower Extremity   Quadriceps:  1/5           Assessment:R knee contusion     Status: L - Limited          Plan:       1. Pain management and swelling control   2. Physician Referral: no  3. ED Referral: no  6.         Eligible to use School Insurance: Yes

## 2023-10-16 ENCOUNTER — CLINICAL SUPPORT (OUTPATIENT)
Dept: REHABILITATION | Facility: HOSPITAL | Age: 18
End: 2023-10-16
Payer: COMMERCIAL

## 2023-10-16 DIAGNOSIS — M25.512 ACUTE PAIN OF LEFT SHOULDER: Primary | ICD-10-CM

## 2023-10-16 DIAGNOSIS — M25.619 LIMITED RANGE OF MOTION (ROM) OF SHOULDER: ICD-10-CM

## 2023-10-16 PROCEDURE — 97140 MANUAL THERAPY 1/> REGIONS: CPT | Performed by: PHYSICAL THERAPIST

## 2023-10-16 PROCEDURE — 97112 NEUROMUSCULAR REEDUCATION: CPT | Performed by: PHYSICAL THERAPIST

## 2023-10-16 PROCEDURE — 97530 THERAPEUTIC ACTIVITIES: CPT | Performed by: PHYSICAL THERAPIST

## 2023-10-17 NOTE — PROGRESS NOTES
Physical Therapy Daily Treatment Note       Name: [facility] Number: [id number]    Therapy Diagnosis:   Encounter Diagnoses   Name Primary?    Acute pain of left shoulder Yes    Limited range of motion (ROM) of shoulder      Physician: Milton Yap III, *    Visit Date: 10/16/2023  Physician Orders: PT Eval and Treat   Medical Diagnosis from Referral:   M25.312 (ICD-10-CM) - Instability of left shoulder joint   M75.22 (ICD-10-CM) - Biceps tendonitis on left   Evaluation Date: 4/11/2023  Authorization Period Expiration: 12/31/2023  Plan of Care Expiration: 10/11/2023  Visit # / Visits authorized: 2/ 20  Total visits 19    Time In: 200 PM   Time Out: 300 PM  Total Billable Time: 60 minutes    Precautions: Standard    OPERATION: 3/21/23   left  1. Shoulder arthroscopic anterior capsulorrhaphy (CPT 28517) (complex, -22 modifier)  2. Shoulder arthroscopic anterior labral repair (CPT 15752)   3. Shoulder arthroscopic posterior labral repair, capsulorrhaphy, 7:00 position (CPT 64610)  4. Shoulder arthroscopic extensive debridement (anterior, posterior glenohumeral joint) (CPT 71043)  5. Shoulder manipulation under anesthesia (CPT 55766)  6. Shoulder arthroscopic lysis of adhesions (CPT 34944):  7. Shoulder arthroscopic SLAP type 8 repair (CPT 23052)  8. Shoulder arthroscopic rotator cuff repair (rotator interval closure, supraspinatus to subscapularis, CPT 73644)    POST OPERATIVE PLAN: We will follow the arthroscopic Bankart repair and SLAP repair guidelines. We discussed with the patient's family after surgery. The patient will remain in a sling for 6 weeks. We will start PT at the 3 week wandy.     Quality of tissue: Good    Quality of the repair: Good    Subjective     Pt reports: Shoulder is doing very well, getting stronger and motion is good. Rolled my ankle yesterday    He was compliant with home exercise program.  Response to previous treatment: sore  Functional change: using LUE more at home  "    Pain: 4/10  Location: left shoulder      Objective     Passive Range of Motion:   Shoulder Right Left   Flexion 180 170   Abduction 90 90   ER at 0 60 70   ER at 90 100 80   IR at 90 60 50      Active Range of Motion:   Shoulder Right Left   Flexion 180 160   Abduction 180 150   ER at 0 90 70   ER at 90 100 85   IR (behind back) T6 T12       Right Left Comment   Shoulder flexion: 5/5 4-/5    Shoulder Abduction: 5/5 3+/5    Shoulder ER: 5/5 4-/5    Shoulder IR: 5/5 3+/5    Lower Trap: 4-/5 3+/5    Middle Trap: 4-/5 3+/5     5/5 4/5      Dieudonne received therapeutic exercises to develop strength, endurance, ROM, flexibility, and posture for 0 minutes including:      NT  Sidelying Cross Body Stretch 10 x :10   Sidelying ER pertubations 3 x 15   Serratus press GTB 3 x 8  Supine wand flexion 30x  Prone extension 2# 3 x 10  Sidelying dowel flexion   Prone row 1# 3 x 10    Dieudonne received the following manual therapy techniques: Joint mobilizations and Soft tissue Mobilization were applied to the: L shoulder for 14 minutes, including:    Gr I-II oscillations  Gr III flexion 0-160 degrees  STM lat, and subscap  Inferior mob seated at 90/90 Gr II  Manual lat stretch   Manual Scap pin + cross body stretch   R ankle TC Gr IV    Dieudonne participated in neuromuscular re-education activities to improve: Coordination, Kinesthetic, Sense, Proprioception, and Posture for 28 minutes. The following activities were included:    Prone over EOT I/T/Y 2# 3 x 8  Scaptions 3# 3 x 15  Overhead rhythmic stab 90/90 black sport cord 3x to fatigue    NT  High row with ER OTB with press 3 x 15  Landmine serratus press 45# 3 x 15    Dieudonne participated in dynamic functional therapeutic activities to improve functional performance for 18 minutes, including:    Wall clock on ground with slider 3 x 8  1/2 kneeling DF self mob 10x 10"    NT  Retro bear crawls 5 laps  Supine flexion GTB on shoe 4 x 12  Push up on 24" box 3x10  Trampoline " plyos   6# med ball chest pass 4x20   4# med ball chops 4x20  LUE serratus shots with basketball 20x  Standing UBE 8' level 7 to improve cardio endurance, functional reaching, push/pull  OH rebounding catch 20x  Upside down KB 10# pertubations 3x to fatigue   Chest press 10# 3 x 8  Genie IR/ER yellow cord 2 x 15  Propped sitting with light ER 0-20 deg 2 x 15  Serratus press OTB 3 x 12  Lateral walks GTB above knees with 4# med ball press 4 laps - mimic basketball defensive sliding  Serratus punch orange cord 3 x 15  Med ball 6# slams at ground 30x  Y with lift off 2 x 15    Dieudonne received cold pack for 0 minutes to to decrease circulation, pain, and swelling.      Home Exercises Provided and Patient Education Provided     Education provided:   - improve cuff stabilization     Written Home Exercises Provided: Patient instructed to cont prior HEP.  Exercises were reviewed and Dieudonne was able to demonstrate them prior to the end of the session.  Dieudonne demonstrated good  understanding of the education provided.     See EMR under Patient Instructions for exercises provided prior visit.    Assessment     Much improved strength to the shoulder with MMT today on arrival. He continues to progress well, appears to have a GR I ankle sprain to ATFL so given self mob to restore ankle DF. No limitations on the basketball court, just needs to continue strengthening overhead to take perturbations in overhead positions when rebounding    Dieudonne Is progressing well towards his goals.   Pt prognosis is Excellent.     Pt will continue to benefit from skilled outpatient physical therapy to address the deficits listed in the problem list box on initial evaluation, provide pt/family education and to maximize pt's level of independence in the home and community environment.     Pt's spiritual, cultural and educational needs considered and pt agreeable to plan of care and goals.    Anticipated barriers to physical therapy:  travel    GOALS: Short Term Goals:  3 weeks(Progressing, not met)  1.Report decreased shoulder pain < / =  3/10  to increase tolerance for return to ADL out the sling  2. Increase PROM return to 90 deg flexion and 10 deg ER   3. Increased strength by 1/3 MMT grade in  L scap to increase tolerance for ADL and work activities.  4. Pt to tolerate HEP to improve ROM and independence with ADL's     Long Term Goals: 24 weeks(Progressing, not met)  1.Report decreased shoulder pain  < / =  1 /10  to increase tolerance for return to basketball  2.Increase AROM to full motion in shoulder pain free  3.Increase strength to >/= 4/5 in L shoulder stabilizers to increase tolerance for ADL and work activities.  4. Pt goal: return to basketball without L shoulder pain   5. Pt will have improved gcode of CJ (20-40% limited) on FOTO shoulder in order to demonstrate true functional improvement.     Plan     Plan of care Certification: 4/11/2023 to 10/11/2023.    Continue to progress through SLAP/Bankart protocol    Devendra Plummer, PT, DPT, OCS

## 2023-10-23 ENCOUNTER — ATHLETIC TRAINING SESSION (OUTPATIENT)
Dept: SPORTS MEDICINE | Facility: CLINIC | Age: 18
End: 2023-10-23
Payer: COMMERCIAL

## 2023-10-23 DIAGNOSIS — M79.672 LEFT FOOT PAIN: Primary | ICD-10-CM

## 2023-10-23 NOTE — PROGRESS NOTES
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    Dieudonne reports to the ATR after practice on 10/20/23 reporting that he got stepped on and it was painful. There was localized swelling on the dorsal aspect of the foot by the 4-5 proximal metatarsal heads. He reports no issues with walking but has some pain with push off and running.     Handedness: right-handed  Sport played:      Level:          Dieudonne also participates in basketball.  Swelling        ROS              Objective:       General: Dieudonne is well-developed, well-nourished, appears stated age, in no acute distress, alert and oriented to time, place and person.             Right Ankle/Foot Exam   Right ankle exam is normal.    Left Ankle/Foot Exam     Swelling   The patient is swollen on the metatarsals.    Tenderness   The patient is tender to palpation of the metatarsals.    Range of Motion   The patient has normal left ankle ROM.     Muscle Strength   The patient has normal left ankle strength.    Tests   Anterior drawer: negative  Varus tilt: negative  Squeeze Test: absent    Other   Sensation: normal              Assessment:     Status: F - Full Participation    Date Seen:  10/20/23    Date of Injury:  10/20/23    Date Out:  n/a    Date Cleared:  10/20/23      Plan:       1. RICE, NSAIDs  2. Physician Referral: no  3. ED Referral: no  4. Parent/Guardian Notified: No  5. All questions were answered, ath. will contact me for questions or concerns in  the interim.  6.         Eligible to use School Insurance: Yes      Dieudonne completed:    [x] INJURY TREATMENT    []MAINTENANCE  DATE OF SERVICE: 10/23/23  INJURY/CONDITON: left foot swelling    Dieudonne reports feeling better and not having any swelling. He fully practiced without any treatment or tape.       Dieudonne completed:    [x] INJURY TREATMENT    []MAINTENANCE  DATE OF SERVICE:  10/21/23  INJURY/CONDITON: left foot swelling    Dieudonne reports still having swelling. He will try to practice. Dieudonne received the selected modalities after being cleared for contradictions.  Dieuodnne received education on potenital side effects of the selected modalities and agreed to treatment.      MODALITIES:       Other   []Foam Roller/Massage Gun [x] RICE, compression for practice   []Recovery Boots []   [x]Milking massage              Dieudonne completed:    [x] INJURY TREATMENT    []MAINTENANCE  DATE OF SERVICE: 10/20/23  INJURY/CONDITON: left foot swelling    Dieudonne reports getting stepped on at practice and having pain and swelling on the top of his foot. Dieudonne received the selected modalities after being cleared for contradictions.  Dieudonne received education on potenital side effects of the selected modalities and agreed to treatment.      MODALITIES:    Other   []Foam Roller/Massage Gun []   []Recovery Boots []   [x]RICE, NSAIDs

## 2023-11-03 ENCOUNTER — OFFICE VISIT (OUTPATIENT)
Dept: SPORTS MEDICINE | Facility: CLINIC | Age: 18
End: 2023-11-03
Payer: COMMERCIAL

## 2023-11-03 ENCOUNTER — CLINICAL SUPPORT (OUTPATIENT)
Dept: REHABILITATION | Facility: HOSPITAL | Age: 18
End: 2023-11-03
Payer: COMMERCIAL

## 2023-11-03 VITALS
SYSTOLIC BLOOD PRESSURE: 128 MMHG | DIASTOLIC BLOOD PRESSURE: 69 MMHG | BODY MASS INDEX: 27.04 KG/M2 | WEIGHT: 233.69 LBS | HEART RATE: 53 BPM | HEIGHT: 78 IN

## 2023-11-03 DIAGNOSIS — M25.619 LIMITED RANGE OF MOTION (ROM) OF SHOULDER: ICD-10-CM

## 2023-11-03 DIAGNOSIS — M25.512 ACUTE PAIN OF LEFT SHOULDER: Primary | ICD-10-CM

## 2023-11-03 DIAGNOSIS — M25.312 SHOULDER INSTABILITY, LEFT: ICD-10-CM

## 2023-11-03 DIAGNOSIS — S43.432D SUPERIOR GLENOID LABRUM LESION OF LEFT SHOULDER, SUBSEQUENT ENCOUNTER: Primary | ICD-10-CM

## 2023-11-03 PROCEDURE — 97112 NEUROMUSCULAR REEDUCATION: CPT

## 2023-11-03 PROCEDURE — 99214 PR OFFICE/OUTPT VISIT, EST, LEVL IV, 30-39 MIN: ICD-10-PCS | Mod: S$GLB,,, | Performed by: ORTHOPAEDIC SURGERY

## 2023-11-03 PROCEDURE — 99999 PR PBB SHADOW E&M-EST. PATIENT-LVL III: CPT | Mod: PBBFAC,,, | Performed by: ORTHOPAEDIC SURGERY

## 2023-11-03 PROCEDURE — 97530 THERAPEUTIC ACTIVITIES: CPT

## 2023-11-03 PROCEDURE — 99999 PR PBB SHADOW E&M-EST. PATIENT-LVL III: ICD-10-PCS | Mod: PBBFAC,,, | Performed by: ORTHOPAEDIC SURGERY

## 2023-11-03 PROCEDURE — 99214 OFFICE O/P EST MOD 30 MIN: CPT | Mod: S$GLB,,, | Performed by: ORTHOPAEDIC SURGERY

## 2023-11-03 PROCEDURE — 97140 MANUAL THERAPY 1/> REGIONS: CPT

## 2023-11-03 RX ORDER — MELOXICAM 15 MG/1
15 TABLET ORAL DAILY
Qty: 30 TABLET | Refills: 1 | Status: SHIPPED | OUTPATIENT
Start: 2023-11-03

## 2023-11-03 NOTE — PROGRESS NOTES
POST-OPERATIVE EXAMINATION, L shoulder pain    18 y.o. Male who returns for follow after surgery. He is 7 months s/p:    3/21/23  Left  1. Shoulder arthroscopic anterior capsulorrhaphy (CPT 11637) (complex, -22 modifier)  2. Shoulder arthroscopic anterior labral repair (CPT 86299)   3. Shoulder arthroscopic posterior labral repair, capsulorrhaphy, 7:00 position (CPT 15180)  4. Shoulder arthroscopic extensive debridement (anterior, posterior glenohumeral joint) (CPT 91678)  5. Shoulder manipulation under anesthesia (CPT 70228)  6. Shoulder arthroscopic lysis of adhesions (CPT 76406):  7. Shoulder arthroscopic SLAP type 8 repair (CPT 50622)  8. Shoulder arthroscopic rotator cuff repair (rotator interval closure, supraspinatus to subscapularis, CPT 16160)    He reports trying to do a dunk and then had left arm weakness with an ache in the posterior shoulder during practice. Pain in the anterior shoulder with abduction. Prior to this event, shoulder was sore but felt that he was progressing.    Basketball season begins 11/6/23 (first game). Plays PF, shar.    PT: PRN with Devendra  SANE: 50  Pain: 0/10       PAST MEDICAL HISTORY: History reviewed. No pertinent past medical history.  PAST SURGICAL HISTORY:   Past Surgical History:   Procedure Laterality Date    ARTHROSCOPIC DEBRIDEMENT OF SHOULDER Left 3/21/2023    Procedure: DEBRIDEMENT, SHOULDER, ARTHROSCOPIC;  Surgeon: Tiffani Snow MD;  Location: [facility] OR;  Service: Orthopedics;  Laterality: Left;    CAPSULORRHAPHY Left 3/21/2023    Procedure: CAPSULORRHAPHY;  Surgeon: Tiffani Snow MD;  Location: [facility] OR;  Service: Orthopedics;  Laterality: Left;    REPAIR OF SUPERIOR LABRAL ANTERIOR-TO-POSTERIOR (SLAP) TEAR OF SHOULDER Left 3/21/2023    Procedure: REPAIR, SLAP LESION, SHOULDER;  Surgeon: Tiffani Snow MD;  Location: [facility] OR;  Service: Orthopedics;  Laterality: Left;    SHOULDER ARTHROSCOPY W/ BANKHART PROCEDURE Left 3/21/2023    Procedure: ARTHROSCOPY, SHOULDER, WITH  "BANKART REPAIR;  Surgeon: Tiffani Snow MD;  Location: [facility];  Service: Orthopedics;  Laterality: Left;     FAMILY HISTORY:   Family History   Problem Relation Age of Onset    No Known Problems Mother     No Known Problems Father      SOCIAL HISTORY:   Social History     Socioeconomic History    Marital status: Single   Tobacco Use    Smoking status: Never    Smokeless tobacco: Never   Substance and Sexual Activity    Alcohol use: Yes    Drug use: Never    Sexual activity: Yes       MEDICATIONS:   Current Outpatient Medications:     aspirin (ECOTRIN) 81 MG EC tablet, Take 1 tablet (81 mg total) by mouth once daily. For 4 weeks starting the day after surgery. (Patient not taking: Reported on 5/1/2023), Disp: 28 tablet, Rfl: 0    meloxicam (MOBIC) 15 MG tablet, Take 1 tablet (15 mg total) by mouth once daily. (Patient not taking: Reported on 5/1/2023), Disp: 30 tablet, Rfl: 0    oxyCODONE-acetaminophen (PERCOCET)  mg per tablet, Take 1 tablet by mouth every 4-6 hours as needed for pain. Take stool softener with this medication. (Patient not taking: Reported on 5/1/2023), Disp: 21 tablet, Rfl: 0    promethazine (PHENERGAN) 25 MG tablet, Take 1 tablet (25 mg total) by mouth every 6 (six) hours as needed for Nausea. (Patient not taking: Reported on 5/1/2023), Disp: 8 tablet, Rfl: 0    traMADoL (ULTRAM) 50 mg tablet, Take 1-2 tablets ( mg total) by mouth every 6 (six) hours as needed for Pain. (Patient not taking: Reported on 5/1/2023), Disp: 21 tablet, Rfl: 0  ALLERGIES: Review of patient's allergies indicates:  No Known Allergies    VITAL SIGNS: /69   Pulse (!) 53   Ht 6' 8" (2.032 m)   Wt 106 kg (233 lb 11 oz)   BMI 25.67 kg/m²      Review of Systems   Constitution: Negative. Negative for chills, fever and night sweats.   HENT: Negative for congestion and headaches.    Eyes: Negative for blurred vision, left vision loss and right vision loss.   Cardiovascular: Negative for chest pain and syncope. " "  Respiratory: Negative for cough and shortness of breath.    Endocrine: Negative for polydipsia, polyphagia and polyuria.   Hematologic/Lymphatic: Negative for bleeding problem. Does not bruise/bleed easily.   Skin: Negative for dry skin, itching and rash.   Musculoskeletal: Negative for falls and muscle weakness.   Gastrointestinal: Negative for abdominal pain and bowel incontinence.   Genitourinary: Negative for bladder incontinence and nocturia.   Neurological: Negative for disturbances in coordination, loss of balance and seizures.   Psychiatric/Behavioral: Negative for depression. The patient does not have insomnia.    Allergic/Immunologic: Negative for hives and persistent infections.     PHYSICAL EXAMINATION:  /69   Pulse (!) 53   Ht 6' 8" (2.032 m)   Wt 106 kg (233 lb 11 oz)   BMI 25.67 kg/m²   General: Well-developed well-nourished 18 y.o. malein no acute distress   Cardiovascular: Regular rhythm   Lungs: No labored breathing or wheezing appreciated   Neuro: Alert and oriented ×3   Psychiatric: well oriented to person, place and time, demonstrates normal mood and affect   Skin: No rashes, lesions or ulcers, normal temperature, turgor, and texture on involved extremity    ORTHOPEDIC EXAM:  Well healed anterior and posterior incisions  Surgical incisions healing well with no outward signs of infection. No tenderness about the shoulder today  Strength: 5/5 IR and ER, scaption 0: 4-/5, scaption 30: 4/5  ROM: ER 0:50, FF-175, IR T12  Tests: None    ASSESSMENT:      ICD-10-CM ICD-9-CM   1. Superior glenoid labrum lesion of left shoulder, subsequent encounter  S43.432D V58.89     840.7               PLAN:     Motion is continuing  to improve,   Continue PT per protocol; arthroscopic Bankart repair and SLAP repair protocol, reordered for today  RTC with Tiffani Snow MD in 6 weeks  4.   Discussed with PT                    "

## 2023-11-03 NOTE — PROGRESS NOTES
Physical Therapy Daily Treatment Note       Name: Dieudonne [facility] Number: [id number]    Therapy Diagnosis:   Encounter Diagnoses   Name Primary?    Acute pain of left shoulder Yes    Limited range of motion (ROM) of shoulder        Physician: Milton Yap III, *    Visit Date: 11/3/2023  Physician Orders: PT Eval and Treat   Medical Diagnosis from Referral:   M25.312 (ICD-10-CM) - Instability of left shoulder joint   M75.22 (ICD-10-CM) - Biceps tendonitis on left   Evaluation Date: 4/11/2023  Authorization Period Expiration: 12/31/2023  Plan of Care Expiration: 10/11/2023  Visit # / Visits authorized: 3/ 20  Total visits 19    Time In: 0900 am  Time Out: 1000 am  Total Billable Time: 60 minutes    Precautions: Standard    OPERATION: 3/21/23   left  1. Shoulder arthroscopic anterior capsulorrhaphy (CPT 12237) (complex, -22 modifier)  2. Shoulder arthroscopic anterior labral repair (CPT 47799)   3. Shoulder arthroscopic posterior labral repair, capsulorrhaphy, 7:00 position (CPT 54652)  4. Shoulder arthroscopic extensive debridement (anterior, posterior glenohumeral joint) (CPT 94739)  5. Shoulder manipulation under anesthesia (CPT 49997)  6. Shoulder arthroscopic lysis of adhesions (CPT 37773):  7. Shoulder arthroscopic SLAP type 8 repair (CPT 38886)  8. Shoulder arthroscopic rotator cuff repair (rotator interval closure, supraspinatus to subscapularis, CPT 59042)    POST OPERATIVE PLAN: We will follow the arthroscopic Bankart repair and SLAP repair guidelines. We discussed with the patient's family after surgery. The patient will remain in a sling for 6 weeks. We will start PT at the 3 week wandy.     Quality of tissue: Good    Quality of the repair: Good    Subjective     Pt reports: went up for a dunk and hit elbow on back board, shoulder has been hurting a lot since    He was compliant with home exercise program.  Response to previous treatment: sore  Functional change: using LUE more at home  "    Pain: 4/10  Location: left shoulder      Objective     Passive Range of Motion:   Shoulder Right Left (pre-treatment) Left  (Post-treatment)   Flexion 180 135 175   Abduction 90 NT NT   ER at 0 60 45 80   ER at 90 100 60 90   IR at 90 60 20 35      Active Range of Motion:   Shoulder Right Left   Flexion 180 160   Abduction 180 150   ER at 0 90 70   ER at 90 100 85   IR (behind back) T6 T12       Right Left Comment   Shoulder flexion: 5/5 4-/5    Shoulder Abduction: 5/5 3+/5    Shoulder ER: 5/5 4-/5    Shoulder IR: 5/5 3+/5    Lower Trap: 4-/5 3+/5    Middle Trap: 4-/5 3+/5     5/5 4/5      Dieudonne received therapeutic exercises to develop strength, endurance, ROM, flexibility, and posture for 00 minutes including:      NT   Sidelying Cross Body Stretch 10 x :10   Sidelying ER pertubations 3 x 15   Serratus press GTB 3 x 8  Supine wand flexion 30x  Prone extension 2# 3 x 10  Sidelying dowel flexion   Prone row 1# 3 x 10    Dieudonne received the following manual therapy techniques: Joint mobilizations and Soft tissue Mobilization were applied to the: L shoulder for 25 minutes, including:  Gr I-II oscillations  Manual Scap pin + cross body stretch   Manual resistance ER/IR iso  Manual resistance ER/IR concentric/eccentic     NT  Manual lat stretch   Gr III flexion 0-160 degrees  STM lat, and subscap  Inferior mob seated at 90/90 Gr II    Dieudonne participated in neuromuscular re-education activities to improve: Coordination, Kinesthetic, Sense, Proprioception, and Posture for 20 minutes. The following activities were included:  Supine rhythmic stabl 90 deg flex; 8 x 20"  BB yellow at 0 deg abd; 5 x 20"  BB yellow at 90 deg flex; 5 x 20"    NT  High row with ER OTB with press 3 x 15  Landmine serratus press 45# 3 x 15    Prone over EOT I/T/Y 2# 3 x 8  Scaptions 3# 3 x 15  Overhead rhythmic stab 90/90 black sport cord 3x to fatigue    Dieudonne participated in dynamic functional therapeutic activities to improve " "functional performance for 15 minutes, including:  Standing T; RTB; 2 x 10  External Rotation robot; OTB; 3 x 15    NT  Retro bear crawls 5 laps  Supine flexion GTB on shoe 4 x 12  Push up on 24" box 3x10  Trampoline plyos   6# med ball chest pass 4x20   4# med ball chops 4x20  LUE serratus shots with basketball 20x  Standing UBE 8' level 7 to improve cardio endurance, functional reaching, push/pull  OH rebounding catch 20x  Upside down KB 10# pertubations 3x to fatigue   Chest press 10# 3 x 8  Genie IR/ER yellow cord 2 x 15  Propped sitting with light ER 0-20 deg 2 x 15  Serratus press OTB 3 x 12  Lateral walks GTB above knees with 4# med ball press 4 laps - mimic basketball defensive sliding  Med ball 6# slams at ground 30x  Serratus punch orange cord 3 x 15  Wall clock on ground with slider 3 x 8  1/2 kneeling DF self mob 10x 10"  Y with lift off 2 x 15    Dieudonne received cold pack for 00 minutes to to decrease circulation, pain, and swelling.      Home Exercises Provided and Patient Education Provided     Education provided:   - improve cuff stabilization     Written Home Exercises Provided: Patient instructed to cont prior HEP.  Exercises were reviewed and Dieudonne was able to demonstrate them prior to the end of the session.  Dieudonne demonstrated good  understanding of the education provided.     See EMR under Patient Instructions for exercises provided prior visit.    Assessment     Dieudonne presented with significant loss of range of motion and increased irritability 2/2 impact at practice. He responded well to RTC facilitation and regression to initial stability interventions. Following this, range improved substantially but shoulder fatigued quickly. Educated on continued light RTC activation and maintenance of motion while pain subsides. Will resume more challenging stability and scapular work when able.     Dieudonne Is progressing well towards his goals.   Pt prognosis is Excellent.     Pt will continue to " benefit from skilled outpatient physical therapy to address the deficits listed in the problem list box on initial evaluation, provide pt/family education and to maximize pt's level of independence in the home and community environment.     Pt's spiritual, cultural and educational needs considered and pt agreeable to plan of care and goals.    Anticipated barriers to physical therapy: travel    GOALS: Short Term Goals:  3 weeks(Progressing, not met)  1.Report decreased shoulder pain < / =  3/10  to increase tolerance for return to ADL out the sling  2. Increase PROM return to 90 deg flexion and 10 deg ER   3. Increased strength by 1/3 MMT grade in  L scap to increase tolerance for ADL and work activities.  4. Pt to tolerate HEP to improve ROM and independence with ADL's     Long Term Goals: 24 weeks(Progressing, not met)  1.Report decreased shoulder pain  < / =  1 /10  to increase tolerance for return to basketball  2.Increase AROM to full motion in shoulder pain free  3.Increase strength to >/= 4/5 in L shoulder stabilizers to increase tolerance for ADL and work activities.  4. Pt goal: return to basketball without L shoulder pain   5. Pt will have improved gcode of CJ (20-40% limited) on FOTO shoulder in order to demonstrate true functional improvement.     Plan     Plan of care Certification: 4/11/2023 to 10/11/2023.    Continue to progress through SLAP/Bankart protocol    Peggy Velásquez, PT, DPT

## 2023-11-08 ENCOUNTER — CLINICAL SUPPORT (OUTPATIENT)
Dept: REHABILITATION | Facility: HOSPITAL | Age: 18
End: 2023-11-08
Payer: COMMERCIAL

## 2023-11-08 DIAGNOSIS — M25.512 ACUTE PAIN OF LEFT SHOULDER: Primary | ICD-10-CM

## 2023-11-08 DIAGNOSIS — M25.619 LIMITED RANGE OF MOTION (ROM) OF SHOULDER: ICD-10-CM

## 2023-11-08 PROCEDURE — 97112 NEUROMUSCULAR REEDUCATION: CPT

## 2023-11-08 PROCEDURE — 97140 MANUAL THERAPY 1/> REGIONS: CPT

## 2023-11-08 PROCEDURE — 97530 THERAPEUTIC ACTIVITIES: CPT

## 2023-11-08 NOTE — PROGRESS NOTES
Physical Therapy Daily Treatment Note       Name: Dieudonne Martin  [facility] Number: [id number]    Therapy Diagnosis:   Encounter Diagnoses   Name Primary?    Acute pain of left shoulder Yes    Limited range of motion (ROM) of shoulder      Physician: Milton Yap III, *    Visit Date: 11/8/2023  Physician Orders: PT Eval and Treat   Medical Diagnosis from Referral:   M25.312 (ICD-10-CM) - Instability of left shoulder joint   M75.22 (ICD-10-CM) - Biceps tendonitis on left   Evaluation Date: 4/11/2023  Authorization Period Expiration: 12/31/2023  Plan of Care Expiration: 10/11/2023  Visit # / Visits authorized: 4/ 20  Total visits 21    Time In:  0900 am  Time Out: 1000 am  Total Billable Time: 60 minutes    Precautions: Standard    OPERATION: 3/21/23   left  1. Shoulder arthroscopic anterior capsulorrhaphy (CPT 47876) (complex, -22 modifier)  2. Shoulder arthroscopic anterior labral repair (CPT 17952)   3. Shoulder arthroscopic posterior labral repair, capsulorrhaphy, 7:00 position (CPT 02798)  4. Shoulder arthroscopic extensive debridement (anterior, posterior glenohumeral joint) (CPT 03284)  5. Shoulder manipulation under anesthesia (CPT 51373)  6. Shoulder arthroscopic lysis of adhesions (CPT 29598):  7. Shoulder arthroscopic SLAP type 8 repair (CPT 08809)  8. Shoulder arthroscopic rotator cuff repair (rotator interval closure, supraspinatus to subscapularis, CPT 55585)    POST OPERATIVE PLAN: We will follow the arthroscopic Bankart repair and SLAP repair guidelines. We discussed with the patient's family after surgery. The patient will remain in a sling for 6 weeks. We will start PT at the 3 week wnady.     Quality of tissue: Good    Quality of the repair: Good    Subjective     Pt reports: Shoulder feels much better than last session     He was compliant with home exercise program.  Response to previous treatment: sore  Functional change: using LUE more at home     Pain: 4/10  Location: left shoulder       Objective     Passive Range of Motion:   Shoulder Right Left   Flexion 180 170   Abduction 90 90   ER at 0 60 70   ER at 90 100 80   IR at 90 60 50      Active Range of Motion:   Shoulder Right Left   Flexion 180 160   Abduction 180 150   ER at 0 90 70   ER at 90 100 85   IR (behind back) T6 T12       Right Left Comment   Shoulder flexion: 5/5 4-/5    Shoulder Abduction: 5/5 3+/5    Shoulder ER: 5/5 4-/5    Shoulder IR: 5/5 3+/5    Lower Trap: 4-/5 3+/5    Middle Trap: 4-/5 3+/5     5/5 4/5      Dieudonne received therapeutic exercises to develop strength, endurance, ROM, flexibility, and posture for 00 minutes including:      NT  Sidelying Cross Body Stretch 10 x :10   Sidelying ER pertubations 3 x 15   Serratus press GTB 3 x 8  Supine wand flexion 30x  Prone extension 2# 3 x 10  Sidelying dowel flexion   Prone row 1# 3 x 10    Dieudonne received the following manual therapy techniques: Joint mobilizations and Soft tissue Mobilization were applied to the: L shoulder for 10 minutes, including:  Manual lat stretch   Manual Scap pin + cross body stretch     NT  Gr I-II oscillations  Gr III flexion 0-160 degrees  STM lat, and subscap  Inferior mob seated at 90/90 Gr II  R ankle TC Gr IV    Dieudonne participated in neuromuscular re-education activities to improve: Coordination, Kinesthetic, Sense, Proprioception, and Posture for 30 minutes. The following activities were included:  CHCF warm up; RTB: 30x flex/ abd  Walkouts OTB; 90-90- 3 x 10  Scaptions 4# 3 x 15  Prone over EOT I/T/Y 0# 3 x 10      NT  High row with ER OTB with press 3 x 15  Landmine serratus press 45# 3 x 15  Overhead rhythmic stab 90/90 black sport cord 3x to fatigue    Dieudonne participated in dynamic functional therapeutic activities to improve functional performance for 20 minutes, including:  Wall clock on wall with GTB 4 x 5 ea  Retro bear crawls 5 laps  's Carry; 10#; ER/IR 3 laps x turf  Upside down KB 10# pertubations 3x to  "fatigue    NT  Supine flexion GTB on shoe 4 x 12  Push up on 24" box 3x10  Trampoline plyos   6# med ball chest pass 4x20   4# med ball chops 4x20  1/2 kneeling DF self mob 10x 10"  LUE serratus shots with basketball 20x  Standing UBE 8' level 7 to improve cardio endurance, functional reaching, push/pull  OH rebounding catch 20x    Chest press 10# 3 x 8  Genie IR/ER yellow cord 2 x 15  Propped sitting with light ER 0-20 deg 2 x 15  Serratus press OTB 3 x 12  Lateral walks GTB above knees with 4# med ball press 4 laps - mimic basketball defensive sliding  Serratus punch orange cord 3 x 15  Med ball 6# slams at ground 30x  Y with lift off 2 x 15    Dieudonne received cold pack for 0 minutes to to decrease circulation, pain, and swelling.      Home Exercises Provided and Patient Education Provided     Education provided:   - improve cuff stabilization     Written Home Exercises Provided: Patient instructed to cont prior HEP.  Exercises were reviewed and Dieudonne was able to demonstrate them prior to the end of the session.  Dieudonne demonstrated good  understanding of the education provided.     See EMR under Patient Instructions for exercises provided prior visit.    Assessment     Dieudonne presented with significant improvement in shoulder range of motion and joint irritability. He was able to return to prior level of exercise with slight decrease in resistance.  He struggled with CKC stability drills due to core rather than shoulder weakness. Will progress tolerance to perturbation and scapular strengthening.     Dieudonne Is progressing well towards his goals.   Pt prognosis is Excellent.     Pt will continue to benefit from skilled outpatient physical therapy to address the deficits listed in the problem list box on initial evaluation, provide pt/family education and to maximize pt's level of independence in the home and community environment.     Pt's spiritual, cultural and educational needs considered and pt agreeable " to plan of care and goals.    Anticipated barriers to physical therapy: travel    GOALS: Short Term Goals:  3 weeks(Progressing, not met)  1.Report decreased shoulder pain < / =  3/10  to increase tolerance for return to ADL out the sling  2. Increase PROM return to 90 deg flexion and 10 deg ER   3. Increased strength by 1/3 MMT grade in  L scap to increase tolerance for ADL and work activities.  4. Pt to tolerate HEP to improve ROM and independence with ADL's     Long Term Goals: 24 weeks(Progressing, not met)  1.Report decreased shoulder pain  < / =  1 /10  to increase tolerance for return to basketball  2.Increase AROM to full motion in shoulder pain free  3.Increase strength to >/= 4/5 in L shoulder stabilizers to increase tolerance for ADL and work activities.  4. Pt goal: return to basketball without L shoulder pain   5. Pt will have improved gcode of CJ (20-40% limited) on FOTO shoulder in order to demonstrate true functional improvement.     Plan     Plan of care Certification: 4/11/2023 to 10/11/2023.    Continue to progress through SLAP/Bankart protocol    Peggy Velásquez, PT, DPT

## 2023-11-16 ENCOUNTER — TELEPHONE (OUTPATIENT)
Dept: SPORTS MEDICINE | Facility: CLINIC | Age: 18
End: 2023-11-16

## 2024-03-27 ENCOUNTER — OFFICE VISIT (OUTPATIENT)
Dept: SPORTS MEDICINE | Facility: CLINIC | Age: 19
End: 2024-03-27
Payer: COMMERCIAL

## 2024-03-27 VITALS
DIASTOLIC BLOOD PRESSURE: 65 MMHG | HEIGHT: 78 IN | SYSTOLIC BLOOD PRESSURE: 110 MMHG | WEIGHT: 246.56 LBS | HEART RATE: 51 BPM | BODY MASS INDEX: 28.53 KG/M2

## 2024-03-27 DIAGNOSIS — M25.312 SHOULDER INSTABILITY, LEFT: Primary | ICD-10-CM

## 2024-03-27 PROCEDURE — 99214 OFFICE O/P EST MOD 30 MIN: CPT | Mod: S$GLB,,, | Performed by: ORTHOPAEDIC SURGERY

## 2024-03-27 PROCEDURE — 99999 PR PBB SHADOW E&M-EST. PATIENT-LVL III: CPT | Mod: PBBFAC,,, | Performed by: ORTHOPAEDIC SURGERY

## 2024-03-27 NOTE — PROGRESS NOTES
CC L shoulder pain    18 y.o. Male who returns for follow after surgery. He is 12 months s/p:    Doing well, no issues this season after RTP  SANE 100    3/21/23  Left  1. Shoulder arthroscopic anterior capsulorrhaphy (CPT 14649) (complex, -22 modifier)  2. Shoulder arthroscopic anterior labral repair (CPT 06761)   3. Shoulder arthroscopic posterior labral repair, capsulorrhaphy, 7:00 position (CPT 52543)  4. Shoulder arthroscopic extensive debridement (anterior, posterior glenohumeral joint) (CPT 73791)  5. Shoulder manipulation under anesthesia (CPT 85583)  6. Shoulder arthroscopic lysis of adhesions (CPT 60823):  7. Shoulder arthroscopic SLAP type 8 repair (CPT 15146)  8. Shoulder arthroscopic rotator cuff repair (rotator interval closure, supraspinatus to subscapularis, CPT 23280)    Plays PF, SR.    PT: PRN with Devendra    Pain: 0/10       PAST MEDICAL HISTORY: History reviewed. No pertinent past medical history.  PAST SURGICAL HISTORY:   Past Surgical History:   Procedure Laterality Date    ARTHROSCOPIC DEBRIDEMENT OF SHOULDER Left 3/21/2023    Procedure: DEBRIDEMENT, SHOULDER, ARTHROSCOPIC;  Surgeon: Tiffani Snow MD;  Location: [facility] OR;  Service: Orthopedics;  Laterality: Left;    CAPSULORRHAPHY Left 3/21/2023    Procedure: CAPSULORRHAPHY;  Surgeon: Tiffani Snow MD;  Location: [facility] OR;  Service: Orthopedics;  Laterality: Left;    REPAIR OF SUPERIOR LABRAL ANTERIOR-TO-POSTERIOR (SLAP) TEAR OF SHOULDER Left 3/21/2023    Procedure: REPAIR, SLAP LESION, SHOULDER;  Surgeon: Tiffani Snow MD;  Location: [facility] OR;  Service: Orthopedics;  Laterality: Left;    SHOULDER ARTHROSCOPY W/ BANKHART PROCEDURE Left 3/21/2023    Procedure: ARTHROSCOPY, SHOULDER, WITH BANKART REPAIR;  Surgeon: Tiffani Snow MD;  Location: [facility] OR;  Service: Orthopedics;  Laterality: Left;     FAMILY HISTORY:   Family History   Problem Relation Age of Onset    No Known Problems Mother     No Known Problems Father      SOCIAL HISTORY:   Social History  "    Socioeconomic History    Marital status: Single   Tobacco Use    Smoking status: Never    Smokeless tobacco: Never   Substance and Sexual Activity    Alcohol use: Yes    Drug use: Never    Sexual activity: Yes       MEDICATIONS:   Current Outpatient Medications:     aspirin (ECOTRIN) 81 MG EC tablet, Take 1 tablet (81 mg total) by mouth once daily. For 4 weeks starting the day after surgery. (Patient not taking: Reported on 5/1/2023), Disp: 28 tablet, Rfl: 0    meloxicam (MOBIC) 15 MG tablet, Take 1 tablet (15 mg total) by mouth once daily. (Patient not taking: Reported on 3/27/2024), Disp: 30 tablet, Rfl: 1    oxyCODONE-acetaminophen (PERCOCET)  mg per tablet, Take 1 tablet by mouth every 4-6 hours as needed for pain. Take stool softener with this medication. (Patient not taking: Reported on 5/1/2023), Disp: 21 tablet, Rfl: 0    promethazine (PHENERGAN) 25 MG tablet, Take 1 tablet (25 mg total) by mouth every 6 (six) hours as needed for Nausea. (Patient not taking: Reported on 5/1/2023), Disp: 8 tablet, Rfl: 0    traMADoL (ULTRAM) 50 mg tablet, Take 1-2 tablets ( mg total) by mouth every 6 (six) hours as needed for Pain. (Patient not taking: Reported on 5/1/2023), Disp: 21 tablet, Rfl: 0  ALLERGIES: Review of patient's allergies indicates:  No Known Allergies    VITAL SIGNS: /65   Pulse (!) 51   Ht 6' 8" (2.032 m)   Wt 111.8 kg (246 lb 9.4 oz)   BMI 27.09 kg/m²      Review of Systems   Constitution: Negative. Negative for chills, fever and night sweats.   HENT: Negative for congestion and headaches.    Eyes: Negative for blurred vision, left vision loss and right vision loss.   Cardiovascular: Negative for chest pain and syncope.   Respiratory: Negative for cough and shortness of breath.    Endocrine: Negative for polydipsia, polyphagia and polyuria.   Hematologic/Lymphatic: Negative for bleeding problem. Does not bruise/bleed easily.   Skin: Negative for dry skin, itching and rash. " "  Musculoskeletal: Negative for falls and muscle weakness.   Gastrointestinal: Negative for abdominal pain and bowel incontinence.   Genitourinary: Negative for bladder incontinence and nocturia.   Neurological: Negative for disturbances in coordination, loss of balance and seizures.   Psychiatric/Behavioral: Negative for depression. The patient does not have insomnia.    Allergic/Immunologic: Negative for hives and persistent infections.     PHYSICAL EXAMINATION:  /65   Pulse (!) 51   Ht 6' 8" (2.032 m)   Wt 111.8 kg (246 lb 9.4 oz)   BMI 27.09 kg/m²   General: Well-developed well-nourished 18 y.o. malein no acute distress   Cardiovascular: Regular rhythm   Lungs: No labored breathing or wheezing appreciated   Neuro: Alert and oriented ×3   Psychiatric: well oriented to person, place and time, demonstrates normal mood and affect   Skin: No rashes, lesions or ulcers, normal temperature, turgor, and texture on involved extremity    ORTHOPEDIC EXAM:  Well healed anterior and posterior incisions  Surgical incisions healing well with no signs of infection. No tenderness about the shoulder today  Strength: 5/5 IR and ER, scaption 0: 5/5, scaption 30: 4/5  ROM: ER 0:50, , IR T10  Translation testing grade 1 ant and post      ASSESSMENT:    S/p above doing well      PLAN:     Cont play   Continue HEP  RTC PRN  4.   Discussed with PT                    "

## 2024-04-01 ENCOUNTER — ATHLETIC TRAINING SESSION (OUTPATIENT)
Dept: SPORTS MEDICINE | Facility: CLINIC | Age: 19
End: 2024-04-01
Payer: COMMERCIAL

## 2024-04-01 DIAGNOSIS — M25.312 SHOULDER INSTABILITY, LEFT: Primary | ICD-10-CM

## (undated) DEVICE — GLOVE SURGEON SYN PF SZ 9

## (undated) DEVICE — APPLICATOR CHLORAPREP ORN 26ML

## (undated) DEVICE — GLOVE BIOGEL SKINSENSE PI 7.0

## (undated) DEVICE — DRESSING XEROFORM FOIL PK 1X8

## (undated) DEVICE — DRESSING SPONGE 8PLY 4X4 STRL

## (undated) DEVICE — SHAVER SYS 5.5 ULRAFRR

## (undated) DEVICE — SUT HOOK LEFT 60 DEG

## (undated) DEVICE — CONNECTOR TUBING STR 5 IN 1

## (undated) DEVICE — WRAP SHLDR HIP ACCU THRM PACK

## (undated) DEVICE — TAPE MEDIPORE 4IN X 2YDS

## (undated) DEVICE — BRACE SHOULDER SLINGSHOT 3 XLG

## (undated) DEVICE — GLOVE ORTHO PF SZ 8.5

## (undated) DEVICE — KIT SHOULDER POSITIONER SPIDER

## (undated) DEVICE — SYR IRRIGATION BULB STER 60ML

## (undated) DEVICE — FIBERTAK PERCUTANEOUS INSERTION KIT

## (undated) DEVICE — BLADE SHAVER 4.5 6/BX

## (undated) DEVICE — SUT HOOK CRESENT MED SP

## (undated) DEVICE — SLINGSHOT 70DEG UP

## (undated) DEVICE — TUBE SET INFLOW/OUTFLOW

## (undated) DEVICE — SUT HOOK RIGHT 60DEG

## (undated) DEVICE — BNDG COFLEX FOAM LF2 ST 4X5YD

## (undated) DEVICE — PAD ABDOMINAL STERILE 8X10IN

## (undated) DEVICE — TUBING SUC UNIV W/CONN 12FT

## (undated) DEVICE — Device

## (undated) DEVICE — CANNULA TRIPLEDAM 8.25MM 7CM

## (undated) DEVICE — KIT PERC INSERATION SUTURETAK

## (undated) DEVICE — CLOSURE SKIN STERI STRIP 1/2X4

## (undated) DEVICE — SOL NACL IRR 3000ML

## (undated) DEVICE — STRIP MEDI WND CLSR 1/2X4IN

## (undated) DEVICE — SUT VICRYL PLUS 0 CT1 18IN

## (undated) DEVICE — NDL SPINAL 18GX3.5 SPINOCAN

## (undated) DEVICE — BNDG COFLEX FOAM LF2 ST 6X5YD

## (undated) DEVICE — DRAPE STERI INSTRUMENT 1018

## (undated) DEVICE — CANNULA TRIPLEDAM 6MM X 7CM

## (undated) DEVICE — SUT 1 36IN PDS II VIO MONO

## (undated) DEVICE — ADHESIVE MASTISOL VIAL 48/BX

## (undated) DEVICE — PAD ABDOMINAL 5X9 STERILE

## (undated) DEVICE — SYR 10CC LUER LOCK

## (undated) DEVICE — PAD DERMAPROX THCK 11X15X1IN

## (undated) DEVICE — SOL NACL IRR 1000ML BTL

## (undated) DEVICE — GOWN ECLIPSE REINF LVL4 TWL XL

## (undated) DEVICE — PROBE ARTHO ENERGY 90 DEG

## (undated) DEVICE — UNDERGLOVES BIOGEL PI SIZE 7.5

## (undated) DEVICE — GOWN SMART IMP BREATHABLE XXLG

## (undated) DEVICE — SUT MCRYL PLUS 4-0 PS2 27IN

## (undated) DEVICE — GAUZE SPONGE 4X4 12PLY

## (undated) DEVICE — SHAVER ULTRAFFR 4.2MM

## (undated) DEVICE — PAD ELECTRODE STER 1.5X3

## (undated) DEVICE — MARKER SKIN STND TIP BLUE BARR

## (undated) DEVICE — NDL 18GA X1 1/2 REG BEVEL

## (undated) DEVICE — SLING SHOT II X-LARGE

## (undated) DEVICE — DRAPE STERI U-SHAPED 47X51IN

## (undated) DEVICE — DRESSING XEROFORM NONADH 1X8IN